# Patient Record
Sex: FEMALE | Race: OTHER | NOT HISPANIC OR LATINO | URBAN - METROPOLITAN AREA
[De-identification: names, ages, dates, MRNs, and addresses within clinical notes are randomized per-mention and may not be internally consistent; named-entity substitution may affect disease eponyms.]

---

## 2020-03-14 ENCOUNTER — INPATIENT (INPATIENT)
Facility: HOSPITAL | Age: 55
LOS: 8 days | Discharge: HOME | DRG: 871 | End: 2020-03-23
Attending: HOSPITALIST | Admitting: HOSPITALIST
Payer: MEDICAID

## 2020-03-14 VITALS
OXYGEN SATURATION: 95 % | TEMPERATURE: 101 F | HEIGHT: 61 IN | SYSTOLIC BLOOD PRESSURE: 125 MMHG | WEIGHT: 169.98 LBS | RESPIRATION RATE: 20 BRPM | DIASTOLIC BLOOD PRESSURE: 70 MMHG | HEART RATE: 110 BPM

## 2020-03-14 DIAGNOSIS — Z29.9 ENCOUNTER FOR PROPHYLACTIC MEASURES, UNSPECIFIED: ICD-10-CM

## 2020-03-14 DIAGNOSIS — E87.1 HYPO-OSMOLALITY AND HYPONATREMIA: ICD-10-CM

## 2020-03-14 DIAGNOSIS — E87.6 HYPOKALEMIA: ICD-10-CM

## 2020-03-14 DIAGNOSIS — J02.9 ACUTE PHARYNGITIS, UNSPECIFIED: ICD-10-CM

## 2020-03-14 DIAGNOSIS — E66.09 OTHER OBESITY DUE TO EXCESS CALORIES: ICD-10-CM

## 2020-03-14 DIAGNOSIS — Z02.9 ENCOUNTER FOR ADMINISTRATIVE EXAMINATIONS, UNSPECIFIED: ICD-10-CM

## 2020-03-14 DIAGNOSIS — I10 ESSENTIAL (PRIMARY) HYPERTENSION: ICD-10-CM

## 2020-03-14 DIAGNOSIS — J12.9 VIRAL PNEUMONIA, UNSPECIFIED: ICD-10-CM

## 2020-03-14 LAB
ALBUMIN SERPL ELPH-MCNC: 4.3 G/DL — SIGNIFICANT CHANGE UP (ref 3.3–5)
ALP SERPL-CCNC: 114 U/L — SIGNIFICANT CHANGE UP (ref 40–120)
ALT FLD-CCNC: 18 U/L — SIGNIFICANT CHANGE UP (ref 10–45)
ANION GAP SERPL CALC-SCNC: 12 MMOL/L — SIGNIFICANT CHANGE UP (ref 5–17)
ANION GAP SERPL CALC-SCNC: 14 MMOL/L — SIGNIFICANT CHANGE UP (ref 5–17)
APPEARANCE UR: CLEAR — SIGNIFICANT CHANGE UP
APTT BLD: 30.6 SEC — SIGNIFICANT CHANGE UP (ref 27.5–36.3)
AST SERPL-CCNC: 19 U/L — SIGNIFICANT CHANGE UP (ref 10–40)
BASOPHILS # BLD AUTO: 0.02 K/UL — SIGNIFICANT CHANGE UP (ref 0–0.2)
BASOPHILS NFR BLD AUTO: 0.2 % — SIGNIFICANT CHANGE UP (ref 0–2)
BILIRUB SERPL-MCNC: 0.3 MG/DL — SIGNIFICANT CHANGE UP (ref 0.2–1.2)
BILIRUB UR-MCNC: NEGATIVE — SIGNIFICANT CHANGE UP
BUN SERPL-MCNC: 10 MG/DL — SIGNIFICANT CHANGE UP (ref 7–23)
BUN SERPL-MCNC: 8 MG/DL — SIGNIFICANT CHANGE UP (ref 7–23)
CALCIUM SERPL-MCNC: 8.5 MG/DL — SIGNIFICANT CHANGE UP (ref 8.4–10.5)
CALCIUM SERPL-MCNC: 9.4 MG/DL — SIGNIFICANT CHANGE UP (ref 8.4–10.5)
CHLORIDE SERPL-SCNC: 104 MMOL/L — SIGNIFICANT CHANGE UP (ref 96–108)
CHLORIDE SERPL-SCNC: 91 MMOL/L — LOW (ref 96–108)
CO2 SERPL-SCNC: 21 MMOL/L — LOW (ref 22–31)
CO2 SERPL-SCNC: 22 MMOL/L — SIGNIFICANT CHANGE UP (ref 22–31)
COLOR SPEC: SIGNIFICANT CHANGE UP
CREAT SERPL-MCNC: 0.6 MG/DL — SIGNIFICANT CHANGE UP (ref 0.5–1.3)
CREAT SERPL-MCNC: 0.73 MG/DL — SIGNIFICANT CHANGE UP (ref 0.5–1.3)
DIFF PNL FLD: NEGATIVE — SIGNIFICANT CHANGE UP
EOSINOPHIL # BLD AUTO: 0.14 K/UL — SIGNIFICANT CHANGE UP (ref 0–0.5)
EOSINOPHIL NFR BLD AUTO: 1.5 % — SIGNIFICANT CHANGE UP (ref 0–6)
GAS PNL BLDV: SIGNIFICANT CHANGE UP
GLUCOSE SERPL-MCNC: 111 MG/DL — HIGH (ref 70–99)
GLUCOSE SERPL-MCNC: 121 MG/DL — HIGH (ref 70–99)
GLUCOSE UR QL: NEGATIVE — SIGNIFICANT CHANGE UP
HCT VFR BLD CALC: 46.9 % — HIGH (ref 34.5–45)
HGB BLD-MCNC: 15.1 G/DL — SIGNIFICANT CHANGE UP (ref 11.5–15.5)
IMM GRANULOCYTES NFR BLD AUTO: 0.2 % — SIGNIFICANT CHANGE UP (ref 0–1.5)
INR BLD: 1.17 RATIO — HIGH (ref 0.88–1.16)
KETONES UR-MCNC: NEGATIVE — SIGNIFICANT CHANGE UP
LEUKOCYTE ESTERASE UR-ACNC: NEGATIVE — SIGNIFICANT CHANGE UP
LYMPHOCYTES # BLD AUTO: 2.37 K/UL — SIGNIFICANT CHANGE UP (ref 1–3.3)
LYMPHOCYTES # BLD AUTO: 24.8 % — SIGNIFICANT CHANGE UP (ref 13–44)
MCHC RBC-ENTMCNC: 26 PG — LOW (ref 27–34)
MCHC RBC-ENTMCNC: 32.2 GM/DL — SIGNIFICANT CHANGE UP (ref 32–36)
MCV RBC AUTO: 80.7 FL — SIGNIFICANT CHANGE UP (ref 80–100)
MONOCYTES # BLD AUTO: 0.91 K/UL — HIGH (ref 0–0.9)
MONOCYTES NFR BLD AUTO: 9.5 % — SIGNIFICANT CHANGE UP (ref 2–14)
NEUTROPHILS # BLD AUTO: 6.11 K/UL — SIGNIFICANT CHANGE UP (ref 1.8–7.4)
NEUTROPHILS NFR BLD AUTO: 63.8 % — SIGNIFICANT CHANGE UP (ref 43–77)
NITRITE UR-MCNC: NEGATIVE — SIGNIFICANT CHANGE UP
NRBC # BLD: 0 /100 WBCS — SIGNIFICANT CHANGE UP (ref 0–0)
PH UR: 7.5 — SIGNIFICANT CHANGE UP (ref 5–8)
PLATELET # BLD AUTO: 217 K/UL — SIGNIFICANT CHANGE UP (ref 150–400)
POTASSIUM SERPL-MCNC: 3.3 MMOL/L — LOW (ref 3.5–5.3)
POTASSIUM SERPL-MCNC: 3.9 MMOL/L — SIGNIFICANT CHANGE UP (ref 3.5–5.3)
POTASSIUM SERPL-SCNC: 3.3 MMOL/L — LOW (ref 3.5–5.3)
POTASSIUM SERPL-SCNC: 3.9 MMOL/L — SIGNIFICANT CHANGE UP (ref 3.5–5.3)
PROT SERPL-MCNC: 8.5 G/DL — HIGH (ref 6–8.3)
PROT UR-MCNC: NEGATIVE — SIGNIFICANT CHANGE UP
PROTHROM AB SERPL-ACNC: 13.4 SEC — HIGH (ref 10–12.9)
RAPID RVP RESULT: SIGNIFICANT CHANGE UP
RBC # BLD: 5.81 M/UL — HIGH (ref 3.8–5.2)
RBC # FLD: 14.3 % — SIGNIFICANT CHANGE UP (ref 10.3–14.5)
S PYO AG SPEC QL IA: NEGATIVE — SIGNIFICANT CHANGE UP
SODIUM SERPL-SCNC: 127 MMOL/L — LOW (ref 135–145)
SODIUM SERPL-SCNC: 137 MMOL/L — SIGNIFICANT CHANGE UP (ref 135–145)
SP GR SPEC: 1.01 — SIGNIFICANT CHANGE UP (ref 1.01–1.02)
UROBILINOGEN FLD QL: NEGATIVE — SIGNIFICANT CHANGE UP
WBC # BLD: 9.57 K/UL — SIGNIFICANT CHANGE UP (ref 3.8–10.5)
WBC # FLD AUTO: 9.57 K/UL — SIGNIFICANT CHANGE UP (ref 3.8–10.5)

## 2020-03-14 PROCEDURE — 71250 CT THORAX DX C-: CPT | Mod: 26

## 2020-03-14 PROCEDURE — 99232 SBSQ HOSP IP/OBS MODERATE 35: CPT | Mod: GC

## 2020-03-14 PROCEDURE — 99223 1ST HOSP IP/OBS HIGH 75: CPT

## 2020-03-14 PROCEDURE — 71045 X-RAY EXAM CHEST 1 VIEW: CPT | Mod: 26

## 2020-03-14 PROCEDURE — 99284 EMERGENCY DEPT VISIT MOD MDM: CPT

## 2020-03-14 RX ORDER — ACETAMINOPHEN 500 MG
975 TABLET ORAL EVERY 6 HOURS
Refills: 0 | Status: DISCONTINUED | OUTPATIENT
Start: 2020-03-14 | End: 2020-03-23

## 2020-03-14 RX ORDER — ACETAMINOPHEN 500 MG
650 TABLET ORAL ONCE
Refills: 0 | Status: COMPLETED | OUTPATIENT
Start: 2020-03-14 | End: 2020-03-14

## 2020-03-14 RX ORDER — ALBUTEROL 90 UG/1
2 AEROSOL, METERED ORAL
Qty: 0 | Refills: 0 | DISCHARGE

## 2020-03-14 RX ORDER — ENOXAPARIN SODIUM 100 MG/ML
40 INJECTION SUBCUTANEOUS DAILY
Refills: 0 | Status: DISCONTINUED | OUTPATIENT
Start: 2020-03-15 | End: 2020-03-23

## 2020-03-14 RX ORDER — ERGOCALCIFEROL 1.25 MG/1
1 CAPSULE ORAL
Qty: 0 | Refills: 0 | DISCHARGE

## 2020-03-14 RX ORDER — OXYBUTYNIN CHLORIDE 5 MG
0 TABLET ORAL
Qty: 0 | Refills: 0 | DISCHARGE

## 2020-03-14 RX ORDER — SODIUM CHLORIDE 9 MG/ML
2400 INJECTION, SOLUTION INTRAVENOUS ONCE
Refills: 0 | Status: COMPLETED | OUTPATIENT
Start: 2020-03-14 | End: 2020-03-14

## 2020-03-14 RX ORDER — POTASSIUM CHLORIDE 20 MEQ
40 PACKET (EA) ORAL ONCE
Refills: 0 | Status: COMPLETED | OUTPATIENT
Start: 2020-03-14 | End: 2020-03-14

## 2020-03-14 RX ORDER — CEFTRIAXONE 500 MG/1
1000 INJECTION, POWDER, FOR SOLUTION INTRAMUSCULAR; INTRAVENOUS EVERY 24 HOURS
Refills: 0 | Status: DISCONTINUED | OUTPATIENT
Start: 2020-03-14 | End: 2020-03-16

## 2020-03-14 RX ORDER — AZITHROMYCIN 500 MG/1
500 TABLET, FILM COATED ORAL EVERY 24 HOURS
Refills: 0 | Status: DISCONTINUED | OUTPATIENT
Start: 2020-03-14 | End: 2020-03-16

## 2020-03-14 RX ORDER — AMPICILLIN SODIUM AND SULBACTAM SODIUM 250; 125 MG/ML; MG/ML
3 INJECTION, POWDER, FOR SUSPENSION INTRAMUSCULAR; INTRAVENOUS ONCE
Refills: 0 | Status: COMPLETED | OUTPATIENT
Start: 2020-03-14 | End: 2020-03-14

## 2020-03-14 RX ADMIN — Medication 975 MILLIGRAM(S): at 13:45

## 2020-03-14 RX ADMIN — SODIUM CHLORIDE 2400 MILLILITER(S): 9 INJECTION, SOLUTION INTRAVENOUS at 05:21

## 2020-03-14 RX ADMIN — Medication 650 MILLIGRAM(S): at 05:21

## 2020-03-14 RX ADMIN — Medication 650 MILLIGRAM(S): at 05:51

## 2020-03-14 RX ADMIN — Medication 650 MILLIGRAM(S): at 21:30

## 2020-03-14 RX ADMIN — AMPICILLIN SODIUM AND SULBACTAM SODIUM 200 GRAM(S): 250; 125 INJECTION, POWDER, FOR SUSPENSION INTRAMUSCULAR; INTRAVENOUS at 05:21

## 2020-03-14 RX ADMIN — Medication 975 MILLIGRAM(S): at 12:49

## 2020-03-14 RX ADMIN — Medication 40 MILLIEQUIVALENT(S): at 12:34

## 2020-03-14 RX ADMIN — CEFTRIAXONE 100 MILLIGRAM(S): 500 INJECTION, POWDER, FOR SOLUTION INTRAMUSCULAR; INTRAVENOUS at 18:24

## 2020-03-14 RX ADMIN — Medication 650 MILLIGRAM(S): at 19:59

## 2020-03-14 RX ADMIN — AZITHROMYCIN 250 MILLIGRAM(S): 500 TABLET, FILM COATED ORAL at 12:34

## 2020-03-14 NOTE — ED ADULT NURSE NOTE - OBJECTIVE STATEMENT
Patient is a 55 year old female complaining of fever x 4 days. Patient has history of. Patient is A&O x 4 and appears sleepy. Pt reports falling ten days ago denies hitting her head, pt has had fever x 4 days and was seen by her pcp and started on abx. pt states she has generalized body aches and throat pain. pt also complaining of right arm. Denies complaints of chest pain, sob, n/v/d, headache, syncope, burning urination, blood in urine, blood in stool. Abd is soft, non tender, non distended. Skin is warm and dry. Color is consistent with ethnicity. Safety and comfort maintained. Will continue to monitor. Patient is a 55 year old female complaining of fever x 4 days. Patient has history of htn. Patient is A&O x 4 and appears sleepy. Pt reports falling ten days ago denies hitting her head, pt has had fever x 4 days and was seen by her pcp and started on abx. pt states she has generalized body aches and throat pain , and weakness. pt also complaining of right arm. Denies complaints of chest pain, sob, n/v/d, headache, syncope, burning urination, blood in urine, blood in stool. Abd is soft, non tender, non distended. Skin is warm and dry. Color is consistent with ethnicity. Safety and comfort maintained. Will continue to monitor.

## 2020-03-14 NOTE — CONSULT NOTE ADULT - ASSESSMENT
55 year old female with a history of fibromyalgia, HTN, seasonal allergies who is presenting with 10 days of fever, found to have CT Chest findings of peripheral GGO.s c/f possible viral pneumonia.    Fevers, ground glass opacities  -f/u COVID 19 PCR  -RVP neg  -f/u urine legionella  -f/u Bcx  -would treat as CAP in interim, CTX/Azithromycin  -send quantiferon

## 2020-03-14 NOTE — CONSULT NOTE ADULT - ATTENDING COMMENTS
Patient seen and examined   Case discussed with Dr. Valverde  I agree with his history and exam as noted above    Patient has no recent travel, lives at home with her  and extended family who are feeling well but she may have had a syncopal episode 3/4 and since then has had non resolving fevers, cough, sinus congestion no sputum    She is resting in bed, mildly tachypneic but is eating her dinner  LUngs- crackles bilaterally posteriorly  Cor- tachy  Abd- non tender  Extrem- right elbow bruise no joint swelling      WBCwnl  CT scan with bilateral ground glass opacities    Agree with testing for COVID-19  Would also check urine legionella  quantiferon  RVP was negative  Agree with ABx Ceftriaxone and Azithromycin pending legionella testing  supportive care  Transfer to ICU if she decompensates respiratory status    Gagandeep Forrest MD  [phone]  After 5pm/weekends [phone]

## 2020-03-14 NOTE — ED PROVIDER NOTE - OBJECTIVE STATEMENT
54yo F h/o HTN here with fever    States 10 days of fever with throat pain, put on levaquin by PCP for pharyngitis however sx continue to worsen with weakness, fevers. No cough

## 2020-03-14 NOTE — H&P ADULT - PROBLEM SELECTOR PLAN 5
Transitions of Care Status:  1.  Name of PCP: Amita Brody  2.  PCP Contacted on Admission: [ ] Y    [ ] N    3.  PCP contacted at Discharge: [ ] Y    [ ] N    [ ] N/A  4.  Post-Discharge Appointment Date and Location:  5.  Summary of Handoff given to PCP: Transitions of Care Status:  1.  Name of PCP: Amita Brody  2.  PCP Contacted on Admission: [x] Y    [ ] N    3.  PCP contacted at Discharge: [ ] Y    [ ] N    [ ] N/A  4.  Post-Discharge Appointment Date and Location:  5.  Summary of Handoff given to PCP: -  on dietary restrictions. Will hold off on nutrition consult for now until COVID-19 ruled out

## 2020-03-14 NOTE — H&P ADULT - PROBLEM SELECTOR PLAN 3
- Lovenox, IPC  - Regular diet -  on dietary restrictions. Will hold off on nutrition consult for now until COVID-19 ruled out Repleted  - Repeat BMP in afternoon

## 2020-03-14 NOTE — PROVIDER CONTACT NOTE (OTHER) - ACTION/TREATMENT ORDERED:
will continue monitor the patient will continue monitor the patient,later kcl 40 meq o x 1 dose.repeat k was 3.9 mmol

## 2020-03-14 NOTE — H&P ADULT - ASSESSMENT
55 year old female with a history of fibromyalgia, HTN, seasonal allergies who is presenting with 10 days of fever and viral symptoms, found to have bilateral GGO concerning for viral pneumonia.

## 2020-03-14 NOTE — H&P ADULT - PROBLEM SELECTOR PROBLEM 3
Prophylactic measure Class 1 obesity due to excess calories without serious comorbidity with body mass index (BMI) of 32.0 to 32.9 in adult Hypokalemia

## 2020-03-14 NOTE — ED PROVIDER NOTE - CLINICAL SUMMARY MEDICAL DECISION MAKING FREE TEXT BOX
[place] PGY-2:  56yo F here w/ fevers and pharyngitis, failed levaquin will obtain bloodwork, IV abx and anticipate admission for further workup

## 2020-03-14 NOTE — H&P ADULT - HISTORY OF PRESENT ILLNESS
55 year old female with a history of fibromyalgia, HTN, seasonal allergies who is presenting with 10 days of fever.    History was obtained via daughter-in-law, a medical student in [place]. She tells me ~2 weeks ago, her mother slipped and fell on a toy at home and injured her right elbow. She went to urgent care. Within next 1-2 days, developed low grade fevers a home, which persisted over the next week up to >101. She also developed myalgias, full body aches, weakness, and throat pain/ odynophagia. She has also had dry cough and some mild dyspnea throughout this period. No diarrhea, N/V, abdominal pain, chest pain.   She went to her PCP office on Wednesday, who told her to go to the hospital to r/o COVID-19 and handed her antibiotics She did not want to come to the ED, however, her fever was so high, her family urged her to go get an evaluation.  No recent travel. She lives with her , sister/brother in law, their kids. 55 year old female with a history of fibromyalgia, HTN, seasonal allergies who is presenting with 10 days of fever.    History was obtained via daughter-in-law, a medical student in [place]. She tells me ~2 weeks ago, her mother slipped and fell on a toy at home and injured her right elbow. She went to urgent care. Within next 1-2 days, developed low grade fevers a home, which persisted over the next week up to >101. She also developed myalgias, full body aches, weakness, and throat pain/ odynophagia. She has also had dry cough and some mild dyspnea throughout this period. No diarrhea, N/V, abdominal pain, chest pain.   She went to her PCP office on Wednesday, who told her to go to the hospital to r/o COVID-19 and handed her antibiotics She did not want to come to the ED, however, her fever was so high, her family urged her to go get an evaluation.  No recent travel. She lives with her , sister/brother in law, their kids. She spent time with her daughter-in-law who is a medical student in [place].

## 2020-03-14 NOTE — ED PROVIDER NOTE - ATTENDING CONTRIBUTION TO CARE
Fever. Awake and Alert. Lungs CTA. Heart RRR. Abdomen soft NTND. CN II-XII grossly intact. Moves all extremities without lateralization. Mouth: Oropharynx clear, uvula midline, no tonsilar hypertrophy, exudate, +erythema, no anterior cervical lymphadenopathy. No drooling. No hoarseness.

## 2020-03-14 NOTE — H&P ADULT - NSHPPHYSICALEXAM_GEN_ALL_CORE
T(C): 37 (03-14-20 @ 09:22), Max: 38.5 (03-14-20 @ 05:37)  HR: 82 (03-14-20 @ 09:22) (82 - 110)  BP: 113/67 (03-14-20 @ 09:22) (103/69 - 125/70)  RR: 19 (03-14-20 @ 09:22) (19 - 20)  SpO2: 99% (03-14-20 @ 09:22) (95% - 99%)  General: In no acute distress, comfortable  Eyes: no conjunctival erythema, EOMI  ENT: MMM, normal dentition  Respiratory: CTA B/L, No wheezing, rales, rhonchi (difficult ot assess with isolation stethoscope)  CV: RRR no murmurs  Abdominal: Soft, NT, ND +BS  MSK: no focal weakness, no joint tenderness, no joint effusions  Extremities: No edema, 2+ peripheral pulses  Neurology: A&Ox3, nonfocal, LIANG x 4, no facial droop  Skin: No Rashes, Hematoma, Ecchymosis  Psych: Calm and appropriate mood and affect

## 2020-03-14 NOTE — H&P ADULT - NSHPLABSRESULTS_GEN_ALL_CORE
Labs personally reviewed:                          15.1   9.57  )-----------( 217      ( 14 Mar 2020 05:14 )             46.9       03-14    127<L>  |  91<L>  |  10  ----------------------------<  111<H>  3.3<L>   |  22  |  0.73    Ca    9.4      14 Mar 2020 05:14    TPro  8.5<H>  /  Alb  4.3  /  TBili  0.3  /  DBili  x   /  AST  19  /  ALT  18  /  AlkPhos  114  03-14      PT/INR - ( 14 Mar 2020 05:37 )   PT: 13.4 sec;   INR: 1.17 ratio         PTT - ( 14 Mar 2020 05:37 )  PTT:30.6 sec    CT of the Chest was performed without intravenous contrast.  Sagittal and coronal reformats were performed.      FINDINGS:    LUNGS AND AIRWAYS: Patent central airways.  Scattered ground glass opacities bilaterally, predominantly in a peripheral distribution, with associated intralobular septal thickening. Scattered linear opacities predominantly in the lower lungs bilaterally, possibly areas of partial linear atelectasis.     PLEURA: No pleural effusion.    MEDIASTINUM AND ROSIBEL: Mediastinal lymphadenopathy is seen. Aortic pulmonary window within lower image 34 measures 1.8 x 1.2 cm in size. Right paratracheal lymph node image 32 measures 1.6 x 1.3 cm    VESSELS: Within normal limits.    HEART: Heart size is normal. No pericardial effusion.    CHEST WALL AND LOWER NECK: Within normal limits.    VISUALIZED UPPER ABDOMEN: Subcentimeter hypodense foci in the liver, too small characterize.    BONES: Within normal limits.    IMPRESSION:     Nonspecific ground glass opacities predominantly in a peripheral distribution may be seen in the setting of viral pneumonia (e.g. COVID-19).                      Imaging personally reviewed:  CXR: Bilateral hazy opacities    EKG personally reviewed:

## 2020-03-14 NOTE — H&P ADULT - PROBLEM SELECTOR PLAN 2
- Holding antihypertensives for now Likely hypovolemic, hyponatremia in the setting of viral respiratory illness  - S/P IVF, will follow up repeat BMP in afternoon. Will give free water if exceeds greater than 8 mEq improvement/ 24 hours

## 2020-03-14 NOTE — H&P ADULT - PROBLEM SELECTOR PLAN 1
With CT findings concerning for COVID-19  RVP negative  - Isolation, Droplet, Contact, Airborne precautions  - COVID-19 sent and pending  - Will cover empirically with CTX/ Azithro for now, however if COVID returns positive, should DC.   - Will send urine legionella, sputum With CT findings concerning for COVID-19  RVP negative  - Isolation, Droplet, Contact, Airborne precautions  - COVID-19 sent and pending  - Will cover empirically with CTX/ Azithro for now, however if COVID returns positive, should DC.   - Will send urine legionella, sputum  - Procal

## 2020-03-14 NOTE — ED PROVIDER NOTE - PHYSICAL EXAMINATION
General: well appearing, interactive, well nourished, NAD  HEENT: pupils equal and reactive, normal external ears bilaterally , BL tonsil swelling with erythema and exudates   Cardiac: RRR, no MRG appreciated  Resp: lungs clear to auscultation bilaterally, symmetric chest wall rise  Abd: soft, nontender, nondistended,   : no CVA tenderness  Neuro: Moving all extremities  Skin:  normal color for race

## 2020-03-14 NOTE — H&P ADULT - PROBLEM SELECTOR PLAN 7
Transitions of Care Status:  1.  Name of PCP: Amita Brody  2.  PCP Contacted on Admission: [x] Y    [ ] N    3.  PCP contacted at Discharge: [ ] Y    [ ] N    [ ] N/A  4.  Post-Discharge Appointment Date and Location:  5.  Summary of Handoff given to PCP:

## 2020-03-14 NOTE — H&P ADULT - NSHPREVIEWOFSYSTEMS_GEN_ALL_CORE
REVIEW OF SYSTEMS:    CONSTITUTIONAL: + weakness, fevers or chills.  EYES: No blurry vision or eye pain.   ENT: + throat pain. No dysphagia.    NECK: No pain or stiffness  RESPIRATORY: + cough, No wheezing, hemoptysis; No shortness of breath  CARDIOVASCULAR: No chest pain or palpitations.  GASTROINTESTINAL: No abdominal pain. No nausea or vomiting; No diarrhea or constipation. No melena or hematochezia.  GENITOURINARY: No dysuria, frequency or hematuria  NEUROLOGICAL: No numbness or weakness. No dizziness or falls.   SKIN: No itching, burning, rashes, or lesions.   LYMPHATIC: No masses or swelling.   All other review of systems is negative unless indicated above.

## 2020-03-14 NOTE — CONSULT NOTE ADULT - SUBJECTIVE AND OBJECTIVE BOX
Patient is a 55y old  Female who presents with a chief complaint of Fever (14 Mar 2020 10:27)    HPI:  55 year old female with a history of fibromyalgia, HTN, seasonal allergies who is presenting with 10 days of fever.    History was obtained via daughter-in-law, a medical student in [place]. She tells me ~2 weeks ago, her mother slipped and fell on a toy at home and injured her right elbow. She went to urgent care. Within next 1-2 days, developed low grade fevers a home, which persisted over the next week up to >101. She also developed myalgias, full body aches, weakness, and throat pain/ odynophagia. She has also had dry cough and some mild dyspnea throughout this period. No diarrhea, N/V, abdominal pain, chest pain.   She went to her PCP office on Wednesday, who told her to go to the hospital to r/o COVID-19 and handed her antibiotics She did not want to come to the ED, however, her fever was so high, her family urged her to go get an evaluation.  No recent travel. She lives with her , sister/brother in law, their kids. She spent time with her daughter-in-law who is a medical student in [place]. (14 Mar 2020 10:27).    HPI and Hospital course reviewed. Pt with high fevers, no leukocytosis, UA, RVP, GAS all negative, CT Chest with peripheral GGO's c/f possible viral pneumonia.     prior hospital charts reviewed [  ]  primary team notes reviewed [  ]  other consultant notes reviewed [  ]  PAST MEDICAL & SURGICAL HISTORY:  Fibromyalgia  Hypertension    Allergies  No Known Allergies    ANTIMICROBIALS (past 90 days)  MEDICATIONS  (STANDING):  ampicillin/sulbactam  IVPB   200 mL/Hr IV Intermittent (20 @ 05:21)    azithromycin  IVPB   250 mL/Hr IV Intermittent (20 @ 12:34)      ANTIMICROBIALS:    azithromycin  IVPB 500 every 24 hours  cefTRIAXone   IVPB 1000 every 24 hours    OTHER MEDS: MEDICATIONS  (STANDING):  acetaminophen   Tablet .. 975 every 6 hours PRN    SOCIAL HISTORY:   hx smoking  non-smoker    FAMILY HISTORY:  No pertinent family history in first degree relatives    REVIEW OF SYSTEMS  [  ] ROS unobtainable because:    [  ] All other systems negative except as noted below:	    Constitutional:  [ ] fever [ ] chills  [ ] weight loss  [ ] weakness  Skin:  [ ] rash [ ] phlebitis	  Eyes: [ ] icterus [ ] pain  [ ] discharge	  ENMT: [ ] sore throat  [ ] thrush [ ] ulcers [ ] exudates  Respiratory: [ ] dyspnea [ ] hemoptysis [ ] cough [ ] sputum	  Cardiovascular:  [ ] chest pain [ ] palpitations [ ] edema	  Gastrointestinal:  [ ] nausea [ ] vomiting [ ] diarrhea [ ] constipation [ ] pain	  Genitourinary:  [ ] dysuria [ ] frequency [ ] hematuria [ ] discharge [ ] flank pain  [ ] incontinence  Musculoskeletal:  [ ] myalgias [ ] arthralgias [ ] arthritis  [ ] back pain  Neurological:  [ ] headache [ ] seizures  [ ] confusion/altered mental status  Psychiatric:  [ ] anxiety [ ] depression	  Hematology/Lymphatics:  [ ] lymphadenopathy  Endocrine:  [ ] adrenal [ ] thyroid  Allergic/Immunologic:	 [ ] transplant [ ] seasonal    Vital Signs Last 24 Hrs  T(F): 98.7 (20 @ 14:30), Max: 102.9 (20 @ 12:29)  Vital Signs Last 24 Hrs  HR: 88 (20 @ 12:29) (82 - 110)  BP: 131/70 (20 @ 12:29) (103/69 - 131/70)  RR: 19 (20 @ 12:29)  SpO2: 99% (20 @ 12:29) (95% - 99%)  Wt(kg): --    PHYSICAL EXAM:  Constitutional: non-toxic, no distress  HEAD/EYES: anicteric, no conjunctival injection  ENT:  supple, no thrush  Cardiovascular:   normal S1, S2, no murmur, no edema  Respiratory:  clear BS bilaterally, no wheezes, no rales  GI:  soft, non-tender, normal bowel sounds  :  no carver, no CVA tenderness  Musculoskeletal:  no synovitis, normal ROM  Neurologic: awake and alert, normal strength, no focal findings  Skin:  no rash, no erythema, no phlebitis  Heme/Onc: no lymphadenopathy   Psychiatric:  awake, alert, appropriate mood                            15.1   9.57  )-----------( 217      ( 14 Mar 2020 05:14 )             46.9         137  |  104  |  8   ----------------------------<  121<H>  3.9   |  21<L>  |  0.60    Ca    8.5      14 Mar 2020 14:29    TPro  8.5<H>  /  Alb  4.3  /  TBili  0.3  /  DBili  x   /  AST  19  /  ALT  18  /  AlkPhos  114  03-14    Urinalysis Basic - ( 14 Mar 2020 16:06 )    Color: Light Yellow / Appearance: Clear / S.014 / pH: x  Gluc: x / Ketone: Negative  / Bili: Negative / Urobili: Negative   Blood: x / Protein: Negative / Nitrite: Negative   Leuk Esterase: Negative / RBC: x / WBC x   Sq Epi: x / Non Sq Epi: x / Bacteria: x    MICROBIOLOGY:    Bcx, COVID 19 pending      Rapid RVP Result: NotDetec ( @ 07:35)      RADIOLOGY:  < from: CT Chest No Cont (20 @ 07:21) >    IMPRESSION:     Nonspecific ground glass opacities predominantly in a peripheral distribution may be seen in the setting of viral pneumonia (e.g. COVID-19).        imaging below personally reviewed

## 2020-03-14 NOTE — H&P ADULT - PROBLEM SELECTOR PLAN 4
Transitions of Care Status:  1.  Name of PCP: Amita Brody  2.  PCP Contacted on Admission: [ ] Y    [ ] N    3.  PCP contacted at Discharge: [ ] Y    [ ] N    [ ] N/A  4.  Post-Discharge Appointment Date and Location:  5.  Summary of Handoff given to PCP: - Lovenox, IPC  - Regular diet - Holding antihypertensives for now  - Routine vital signs

## 2020-03-14 NOTE — ED PROVIDER NOTE - NS ED ROS FT
CONSTITUTIONAL: No fevers, no chills  Cardiovascular: No Chest pain  Gastrointestinal: No n/v/d, no abd pain  SKIN: no rashes.  PSYCHIATRIC: no known mental health issues.

## 2020-03-14 NOTE — H&P ADULT - PROBLEM SELECTOR PROBLEM 5
Discharge planning issues Class 1 obesity due to excess calories without serious comorbidity with body mass index (BMI) of 32.0 to 32.9 in adult

## 2020-03-15 LAB
ANION GAP SERPL CALC-SCNC: 14 MMOL/L — SIGNIFICANT CHANGE UP (ref 5–17)
BASOPHILS # BLD AUTO: 0.03 K/UL — SIGNIFICANT CHANGE UP (ref 0–0.2)
BASOPHILS NFR BLD AUTO: 0.3 % — SIGNIFICANT CHANGE UP (ref 0–2)
BUN SERPL-MCNC: 6 MG/DL — LOW (ref 7–23)
CALCIUM SERPL-MCNC: 9 MG/DL — SIGNIFICANT CHANGE UP (ref 8.4–10.5)
CHLORIDE SERPL-SCNC: 100 MMOL/L — SIGNIFICANT CHANGE UP (ref 96–108)
CO2 SERPL-SCNC: 22 MMOL/L — SIGNIFICANT CHANGE UP (ref 22–31)
CREAT SERPL-MCNC: 0.74 MG/DL — SIGNIFICANT CHANGE UP (ref 0.5–1.3)
EOSINOPHIL # BLD AUTO: 0 K/UL — SIGNIFICANT CHANGE UP (ref 0–0.5)
EOSINOPHIL NFR BLD AUTO: 0 % — SIGNIFICANT CHANGE UP (ref 0–6)
GLUCOSE SERPL-MCNC: 105 MG/DL — HIGH (ref 70–99)
HCT VFR BLD CALC: 45.6 % — HIGH (ref 34.5–45)
HCV AB S/CO SERPL IA: 0.13 S/CO — SIGNIFICANT CHANGE UP (ref 0–0.99)
HCV AB SERPL-IMP: SIGNIFICANT CHANGE UP
HGB BLD-MCNC: 14.6 G/DL — SIGNIFICANT CHANGE UP (ref 11.5–15.5)
IMM GRANULOCYTES NFR BLD AUTO: 0.6 % — SIGNIFICANT CHANGE UP (ref 0–1.5)
LEGIONELLA AG UR QL: NEGATIVE — SIGNIFICANT CHANGE UP
LYMPHOCYTES # BLD AUTO: 3.71 K/UL — HIGH (ref 1–3.3)
LYMPHOCYTES # BLD AUTO: 31.7 % — SIGNIFICANT CHANGE UP (ref 13–44)
MCHC RBC-ENTMCNC: 25.9 PG — LOW (ref 27–34)
MCHC RBC-ENTMCNC: 32 GM/DL — SIGNIFICANT CHANGE UP (ref 32–36)
MCV RBC AUTO: 81 FL — SIGNIFICANT CHANGE UP (ref 80–100)
MONOCYTES # BLD AUTO: 0.87 K/UL — SIGNIFICANT CHANGE UP (ref 0–0.9)
MONOCYTES NFR BLD AUTO: 7.4 % — SIGNIFICANT CHANGE UP (ref 2–14)
NEUTROPHILS # BLD AUTO: 7.04 K/UL — SIGNIFICANT CHANGE UP (ref 1.8–7.4)
NEUTROPHILS NFR BLD AUTO: 60 % — SIGNIFICANT CHANGE UP (ref 43–77)
NRBC # BLD: 0 /100 WBCS — SIGNIFICANT CHANGE UP (ref 0–0)
PLATELET # BLD AUTO: 214 K/UL — SIGNIFICANT CHANGE UP (ref 150–400)
POTASSIUM SERPL-MCNC: 3.7 MMOL/L — SIGNIFICANT CHANGE UP (ref 3.5–5.3)
POTASSIUM SERPL-SCNC: 3.7 MMOL/L — SIGNIFICANT CHANGE UP (ref 3.5–5.3)
RBC # BLD: 5.63 M/UL — HIGH (ref 3.8–5.2)
RBC # FLD: 14.3 % — SIGNIFICANT CHANGE UP (ref 10.3–14.5)
SODIUM SERPL-SCNC: 136 MMOL/L — SIGNIFICANT CHANGE UP (ref 135–145)
WBC # BLD: 11.72 K/UL — HIGH (ref 3.8–10.5)
WBC # FLD AUTO: 11.72 K/UL — HIGH (ref 3.8–10.5)

## 2020-03-15 PROCEDURE — 99233 SBSQ HOSP IP/OBS HIGH 50: CPT

## 2020-03-15 RX ORDER — GABAPENTIN 400 MG/1
300 CAPSULE ORAL DAILY
Refills: 0 | Status: DISCONTINUED | OUTPATIENT
Start: 2020-03-15 | End: 2020-03-19

## 2020-03-15 RX ORDER — CHOLECALCIFEROL (VITAMIN D3) 125 MCG
1000 CAPSULE ORAL DAILY
Refills: 0 | Status: DISCONTINUED | OUTPATIENT
Start: 2020-03-15 | End: 2020-03-23

## 2020-03-15 RX ORDER — LANOLIN ALCOHOL/MO/W.PET/CERES
5 CREAM (GRAM) TOPICAL ONCE
Refills: 0 | Status: COMPLETED | OUTPATIENT
Start: 2020-03-15 | End: 2020-03-15

## 2020-03-15 RX ADMIN — Medication 975 MILLIGRAM(S): at 05:03

## 2020-03-15 RX ADMIN — Medication 975 MILLIGRAM(S): at 17:49

## 2020-03-15 RX ADMIN — Medication 975 MILLIGRAM(S): at 17:14

## 2020-03-15 RX ADMIN — ENOXAPARIN SODIUM 40 MILLIGRAM(S): 100 INJECTION SUBCUTANEOUS at 11:17

## 2020-03-15 RX ADMIN — Medication 5 MILLIGRAM(S): at 22:28

## 2020-03-15 RX ADMIN — AZITHROMYCIN 250 MILLIGRAM(S): 500 TABLET, FILM COATED ORAL at 12:15

## 2020-03-15 RX ADMIN — CEFTRIAXONE 100 MILLIGRAM(S): 500 INJECTION, POWDER, FOR SOLUTION INTRAMUSCULAR; INTRAVENOUS at 17:05

## 2020-03-15 RX ADMIN — Medication 975 MILLIGRAM(S): at 04:33

## 2020-03-15 NOTE — PROGRESS NOTE ADULT - PROBLEM SELECTOR PLAN 2
Likely hypovolemic, hyponatremia in the setting of viral respiratory illness  - S/P IVF,  - resolved

## 2020-03-15 NOTE — PROGRESS NOTE ADULT - PROBLEM SELECTOR PLAN 1
CT chest with Nonspecific ground glass opacities predominantly in a peripheral distribution concerning for covid   RVP negative  - Isolation, Droplet, Contact, Airborne precautions  - COVID-19 sent and pending  - Will cover empirically with CTX/ Azithro   - Follow up urine legionella, sputum cx, blood cx   - ID eval

## 2020-03-15 NOTE — PROGRESS NOTE ADULT - PROBLEM SELECTOR PROBLEM 5
Class 1 obesity due to excess calories without serious comorbidity with body mass index (BMI) of 32.0 to 32.9 in adult

## 2020-03-15 NOTE — PROGRESS NOTE ADULT - SUBJECTIVE AND OBJECTIVE BOX
Patient is a 55y old  Female who presents with a chief complaint of Fever (14 Mar 2020 17:07)      SUBJECTIVE / OVERNIGHT EVENTS: feels ok, breathing is better, no cp    MEDICATIONS  (STANDING):  azithromycin  IVPB 500 milliGRAM(s) IV Intermittent every 24 hours  cefTRIAXone   IVPB 1000 milliGRAM(s) IV Intermittent every 24 hours  enoxaparin Injectable 40 milliGRAM(s) SubCutaneous daily    MEDICATIONS  (PRN):  acetaminophen   Tablet .. 975 milliGRAM(s) Oral every 6 hours PRN Mild Pain (1 - 3)        CAPILLARY BLOOD GLUCOSE        I&O's Summary    14 Mar 2020 07:01  -  15 Mar 2020 07:00  --------------------------------------------------------  IN: 650 mL / OUT: 1800 mL / NET: -1150 mL    15 Mar 2020 07:01  -  15 Mar 2020 17:16  --------------------------------------------------------  IN: 1260 mL / OUT: 0 mL / NET: 1260 mL        PHYSICAL EXAM:  GENERAL: NAD, well-developed  HEAD:  Atraumatic, Normocephalic  EYES: conjunctiva and sclera clear  NECK:  No JVD  CHEST/LUNG: crackels at bases   HEART: Regular rate and rhythm; S1S2  ABDOMEN: Soft, Nontender, Nondistended; Bowel sounds present  EXTREMITIES:  2+ Peripheral Pulses, No clubbing, cyanosis, or edema  PSYCH: AAOx3  NEUROLOGY: non-focal  SKIN: No rashes or lesions    LABS:                        14.6   11.72 )-----------( 214      ( 15 Mar 2020 05:13 )             45.6     03-15    136  |  100  |  6<L>  ----------------------------<  105<H>  3.7   |  22  |  0.74    Ca    9.0      15 Mar 2020 05:13    TPro  8.5<H>  /  Alb  4.3  /  TBili  0.3  /  DBili  x   /  AST  19  /  ALT  18  /  AlkPhos  114  03-14    PT/INR - ( 14 Mar 2020 05:37 )   PT: 13.4 sec;   INR: 1.17 ratio         PTT - ( 14 Mar 2020 05:37 )  PTT:30.6 sec      Urinalysis Basic - ( 14 Mar 2020 16:06 )    Color: Light Yellow / Appearance: Clear / S.014 / pH: x  Gluc: x / Ketone: Negative  / Bili: Negative / Urobili: Negative   Blood: x / Protein: Negative / Nitrite: Negative   Leuk Esterase: Negative / RBC: x / WBC x   Sq Epi: x / Non Sq Epi: x / Bacteria: x        RADIOLOGY & ADDITIONAL TESTS:    Imaging Personally Reviewed:    Consultant(s) Notes Reviewed:      Care Discussed with Consultants/Other Providers:

## 2020-03-16 DIAGNOSIS — R52 PAIN, UNSPECIFIED: ICD-10-CM

## 2020-03-16 LAB
ANION GAP SERPL CALC-SCNC: 14 MMOL/L — SIGNIFICANT CHANGE UP (ref 5–17)
BUN SERPL-MCNC: 9 MG/DL — SIGNIFICANT CHANGE UP (ref 7–23)
CALCIUM SERPL-MCNC: 9.1 MG/DL — SIGNIFICANT CHANGE UP (ref 8.4–10.5)
CHLORIDE SERPL-SCNC: 101 MMOL/L — SIGNIFICANT CHANGE UP (ref 96–108)
CO2 SERPL-SCNC: 22 MMOL/L — SIGNIFICANT CHANGE UP (ref 22–31)
CREAT SERPL-MCNC: 0.57 MG/DL — SIGNIFICANT CHANGE UP (ref 0.5–1.3)
GAMMA INTERFERON BACKGROUND BLD IA-ACNC: 0.14 IU/ML — SIGNIFICANT CHANGE UP
GLUCOSE SERPL-MCNC: 100 MG/DL — HIGH (ref 70–99)
HCT VFR BLD CALC: 44 % — SIGNIFICANT CHANGE UP (ref 34.5–45)
HGB BLD-MCNC: 13.7 G/DL — SIGNIFICANT CHANGE UP (ref 11.5–15.5)
LEGIONELLA AG UR QL: NEGATIVE — SIGNIFICANT CHANGE UP
M TB IFN-G BLD-IMP: NEGATIVE — SIGNIFICANT CHANGE UP
M TB IFN-G CD4+ BCKGRND COR BLD-ACNC: 0.01 IU/ML — SIGNIFICANT CHANGE UP
M TB IFN-G CD4+CD8+ BCKGRND COR BLD-ACNC: 0 IU/ML — SIGNIFICANT CHANGE UP
MCHC RBC-ENTMCNC: 25 PG — LOW (ref 27–34)
MCHC RBC-ENTMCNC: 31.1 GM/DL — LOW (ref 32–36)
MCV RBC AUTO: 80.4 FL — SIGNIFICANT CHANGE UP (ref 80–100)
NRBC # BLD: 0 /100 WBCS — SIGNIFICANT CHANGE UP (ref 0–0)
PLATELET # BLD AUTO: 221 K/UL — SIGNIFICANT CHANGE UP (ref 150–400)
POTASSIUM SERPL-MCNC: 3.8 MMOL/L — SIGNIFICANT CHANGE UP (ref 3.5–5.3)
POTASSIUM SERPL-SCNC: 3.8 MMOL/L — SIGNIFICANT CHANGE UP (ref 3.5–5.3)
QUANT TB PLUS MITOGEN MINUS NIL: 7.37 IU/ML — SIGNIFICANT CHANGE UP
RBC # BLD: 5.47 M/UL — HIGH (ref 3.8–5.2)
RBC # FLD: 14.2 % — SIGNIFICANT CHANGE UP (ref 10.3–14.5)
SARS-COV-2 RNA SPEC QL NAA+PROBE: DETECTED
SODIUM SERPL-SCNC: 137 MMOL/L — SIGNIFICANT CHANGE UP (ref 135–145)
WBC # BLD: 10.16 K/UL — SIGNIFICANT CHANGE UP (ref 3.8–10.5)
WBC # FLD AUTO: 10.16 K/UL — SIGNIFICANT CHANGE UP (ref 3.8–10.5)

## 2020-03-16 PROCEDURE — 99232 SBSQ HOSP IP/OBS MODERATE 35: CPT

## 2020-03-16 PROCEDURE — 99233 SBSQ HOSP IP/OBS HIGH 50: CPT

## 2020-03-16 RX ORDER — GUAIFENESIN/DEXTROMETHORPHAN 600MG-30MG
10 TABLET, EXTENDED RELEASE 12 HR ORAL ONCE
Refills: 0 | Status: COMPLETED | OUTPATIENT
Start: 2020-03-16 | End: 2020-03-16

## 2020-03-16 RX ORDER — LANOLIN ALCOHOL/MO/W.PET/CERES
1 CREAM (GRAM) TOPICAL AT BEDTIME
Refills: 0 | Status: DISCONTINUED | OUTPATIENT
Start: 2020-03-16 | End: 2020-03-23

## 2020-03-16 RX ORDER — LANOLIN ALCOHOL/MO/W.PET/CERES
5 CREAM (GRAM) TOPICAL ONCE
Refills: 0 | Status: COMPLETED | OUTPATIENT
Start: 2020-03-16 | End: 2020-03-16

## 2020-03-16 RX ADMIN — Medication 5 MILLIGRAM(S): at 22:38

## 2020-03-16 RX ADMIN — Medication 10 MILLILITER(S): at 09:14

## 2020-03-16 RX ADMIN — ENOXAPARIN SODIUM 40 MILLIGRAM(S): 100 INJECTION SUBCUTANEOUS at 14:55

## 2020-03-16 RX ADMIN — Medication 1 TABLET(S): at 14:55

## 2020-03-16 RX ADMIN — Medication 1000 UNIT(S): at 14:55

## 2020-03-16 RX ADMIN — GABAPENTIN 300 MILLIGRAM(S): 400 CAPSULE ORAL at 14:55

## 2020-03-16 RX ADMIN — Medication 10 MILLILITER(S): at 23:00

## 2020-03-16 NOTE — PROGRESS NOTE ADULT - SUBJECTIVE AND OBJECTIVE BOX
[facility] Division of Hospital Medicine  Torie Garcia MD  Pager (M-F, 8A-5P): [phone]  Other Times:  [phone]    Patient is a 55y old  Female who presents with a chief complaint of Fever (15 Mar 2020 17:15)      SUBJECTIVE / OVERNIGHT EVENTS:  c/o right arm elbow pain worse with pressure. febrile 100.6. still with coughing denies any significant SOB  Sore throat with difficulty swallowing.     ADDITIONAL REVIEW OF SYSTEMS:    MEDICATIONS  (STANDING):  azithromycin  IVPB 500 milliGRAM(s) IV Intermittent every 24 hours  cefTRIAXone   IVPB 1000 milliGRAM(s) IV Intermittent every 24 hours  cholecalciferol 1000 Unit(s) Oral daily  enoxaparin Injectable 40 milliGRAM(s) SubCutaneous daily  gabapentin 300 milliGRAM(s) Oral daily  multivitamin 1 Tablet(s) Oral daily    MEDICATIONS  (PRN):  acetaminophen   Tablet .. 975 milliGRAM(s) Oral every 6 hours PRN Mild Pain (1 - 3)      CAPILLARY BLOOD GLUCOSE        I&O's Summary    15 Mar 2020 07:01  -  16 Mar 2020 07:00  --------------------------------------------------------  IN: 1380 mL / OUT: 0 mL / NET: 1380 mL    16 Mar 2020 07:01  -  16 Mar 2020 14:32  --------------------------------------------------------  IN: 120 mL / OUT: 300 mL / NET: -180 mL        PHYSICAL EXAM:  Vital Signs Last 24 Hrs  T(C): 37 (16 Mar 2020 12:34), Max: 38.1 (15 Mar 2020 17:38)  T(F): 98.6 (16 Mar 2020 12:34), Max: 100.6 (15 Mar 2020 17:38)  HR: 83 (16 Mar 2020 12:34) (82 - 85)  BP: 115/60 (16 Mar 2020 12:34) (111/72 - 121/75)  BP(mean): --  RR: 18 (16 Mar 2020 12:34) (18 - 18)  SpO2: 94% (16 Mar 2020 12:34) (93% - 95%)    CONSTITUTIONAL: NAD, obese  EYES:  conjunctiva and sclera clear  ENMT: dry oral mucosa  NECK: Supple, no palpable masses; no JVD  RESPIRATORY: Normal respiratory effort, bibasilar rhonchi  CARDIOVASCULAR: Regular rate and rhythm, normal S1 and S2, no murmur/rub/gallop; trace LE edema  ABDOMEN: Nontender to palpation, normoactive bowel sounds, no rebound/guarding  MUSCULOSKELETAL:  dec RUE range of motion sec to pain in elbow. no tenderness or edema.   PSYCH: A+O to person, place, and time; affect appropriate  SKIN: No rashes; no palpable lesions    LABS:                        13.7   10.16 )-----------( 221      ( 16 Mar 2020 07:19 )             44.0     03-16    137  |  101  |  9   ----------------------------<  100<H>  3.8   |  22  |  0.57    Ca    9.1      16 Mar 2020 07:14            Urinalysis Basic - ( 14 Mar 2020 16:06 )    Color: Light Yellow / Appearance: Clear / S.014 / pH: x  Gluc: x / Ketone: Negative  / Bili: Negative / Urobili: Negative   Blood: x / Protein: Negative / Nitrite: Negative   Leuk Esterase: Negative / RBC: x / WBC x   Sq Epi: x / Non Sq Epi: x / Bacteria: x        Culture - Group A Streptococcus (collected 14 Mar 2020 09:14)  Source: .Throat  Final Report (15 Mar 2020 22:59):    No Streptococcus pyogenes (Group A) isolated    Culture - Blood (collected 14 Mar 2020 06:54)  Source: .Blood Blood-Peripheral  Preliminary Report (15 Mar 2020 07:01):    No growth to date.    Culture - Blood (collected 14 Mar 2020 06:54)  Source: .Blood Blood-Peripheral  Preliminary Report (15 Mar 2020 07:01):    No growth to date.        RADIOLOGY & ADDITIONAL TESTS:  Results Reviewed:   Imaging Personally Reviewed:  Electrocardiogram Personally Reviewed:    COORDINATION OF CARE:  Care Discussed with Consultants/Other Providers [Y/N]:  Prior or Outpatient Records Reviewed [Y/N]:

## 2020-03-16 NOTE — PROGRESS NOTE ADULT - SUBJECTIVE AND OBJECTIVE BOX
INFECTIOUS DISEASES FOLLOW UP-- Ivory Forrest  [phone]    This is a follow up note for this  55yFemale with  Acute pharyngitis  sore throat persists, minimal cough      ROS:  CONSTITUTIONAL: sore thraot, intermittent dry cough  but comfortable not using supplemental oxygen watching tV    Allergies    No Known Allergies    Intolerances        ANTIBIOTICS/RELEVANT:  antimicrobials    immunologic:    OTHER:  acetaminophen   Tablet .. 975 milliGRAM(s) Oral every 6 hours PRN  cholecalciferol 1000 Unit(s) Oral daily  enoxaparin Injectable 40 milliGRAM(s) SubCutaneous daily  gabapentin 300 milliGRAM(s) Oral daily  melatonin 1 milliGRAM(s) Oral at bedtime  multivitamin 1 Tablet(s) Oral daily      Objective:  Vital Signs Last 24 Hrs  T(C): 37 (16 Mar 2020 12:34), Max: 37.3 (16 Mar 2020 05:30)  T(F): 98.6 (16 Mar 2020 12:34), Max: 99.1 (16 Mar 2020 05:30)  HR: 83 (16 Mar 2020 12:34) (82 - 85)  BP: 115/60 (16 Mar 2020 12:34) (111/72 - 121/75)  BP(mean): --  RR: 18 (16 Mar 2020 12:34) (18 - 18)  SpO2: 94% (16 Mar 2020 12:34) (93% - 95%)    PHYSICAL EXAM:  Constitutional:no acute distress  Eyes:MILTON, EOMI  Ear/Nose/Throat: no oral lesions, 	  Respiratory: clear BL  Cardiovascular: S1S2  Gastrointestinal:soft, (+) BS, no tenderness  Extremities:no e/e/c  No Lymphadenopathy  IV sites not inflammed.    LABS:                        13.7   10.16 )-----------( 221      ( 16 Mar 2020 07:19 )             44.0     03-16    137  |  101  |  9   ----------------------------<  100<H>  3.8   |  22  |  0.57    Ca    9.1      16 Mar 2020 07:14            MICROBIOLOGY:            RECENT CULTURES:  03-14 @ 09:14  .Throat  --  --  --    No Streptococcus pyogenes (Group A) isolated  --  03-14 @ 06:54  .Blood Blood-Peripheral  --  --  --    No growth to date.  --      RADIOLOGY & ADDITIONAL STUDIES:    < from: CT Chest No Cont (03.14.20 @ 07:21) >    IMPRESSION:     Nonspecific ground glass opacities predominantly in a peripheral distribution may be seen in the setting of viral pneumonia (e.g. COVID-19).    < end of copied text >

## 2020-03-16 NOTE — PROGRESS NOTE ADULT - PROBLEM SELECTOR PLAN 1
COVID + confirmed. CT chest with Nonspecific ground glass opacities   - Isolation, Droplet, Contact, Airborne precautions  - Will d/c CTX/ Azithro   - Follow up urine legionella, sputum cx, blood cx  - Quant gold neg.

## 2020-03-16 NOTE — PROGRESS NOTE ADULT - PROBLEM SELECTOR PLAN 2
C/o right UE pain worse with movement.   Recent fall on 3/4 s/p xray as outpt neg.   No evidence of significant bone injury. likely more  MS in etiology  Monitor pain control.

## 2020-03-16 NOTE — PROGRESS NOTE ADULT - ASSESSMENT
55 year old female with a history of fibromyalgia, HTN, seasonal allergies who is presenting with 10 days of fever, found to have CT Chest findings of peripheral GGO.s c/f possible viral pneumonia.    Fevers, ground glass opacities  -positive COVID 19 PCR  -RVP neg  -f/u urine legionella was negative  antibiotics were discontinued  send G6PD  supportive care  monitor oxygenation sat.    Gagandeep Forrest MD  [phone]  After 5pm/weekends [phone]

## 2020-03-16 NOTE — PROVIDER CONTACT NOTE (CRITICAL VALUE NOTIFICATION) - ACTION/TREATMENT ORDERED:
No further actions/treatments ordered at this time. Will continue to monitor and maintain pt safety.

## 2020-03-17 LAB
ALBUMIN SERPL ELPH-MCNC: 3.3 G/DL — SIGNIFICANT CHANGE UP (ref 3.3–5)
ALP SERPL-CCNC: 86 U/L — SIGNIFICANT CHANGE UP (ref 40–120)
ALT FLD-CCNC: 19 U/L — SIGNIFICANT CHANGE UP (ref 10–45)
ANION GAP SERPL CALC-SCNC: 15 MMOL/L — SIGNIFICANT CHANGE UP (ref 5–17)
AST SERPL-CCNC: 23 U/L — SIGNIFICANT CHANGE UP (ref 10–40)
BASOPHILS # BLD AUTO: 0 K/UL — SIGNIFICANT CHANGE UP (ref 0–0.2)
BASOPHILS NFR BLD AUTO: 0 % — SIGNIFICANT CHANGE UP (ref 0–2)
BILIRUB SERPL-MCNC: 0.2 MG/DL — SIGNIFICANT CHANGE UP (ref 0.2–1.2)
BUN SERPL-MCNC: 10 MG/DL — SIGNIFICANT CHANGE UP (ref 7–23)
CALCIUM SERPL-MCNC: 8.9 MG/DL — SIGNIFICANT CHANGE UP (ref 8.4–10.5)
CHLORIDE SERPL-SCNC: 101 MMOL/L — SIGNIFICANT CHANGE UP (ref 96–108)
CO2 SERPL-SCNC: 21 MMOL/L — LOW (ref 22–31)
CREAT SERPL-MCNC: 0.57 MG/DL — SIGNIFICANT CHANGE UP (ref 0.5–1.3)
EOSINOPHIL # BLD AUTO: 0.18 K/UL — SIGNIFICANT CHANGE UP (ref 0–0.5)
EOSINOPHIL NFR BLD AUTO: 1.8 % — SIGNIFICANT CHANGE UP (ref 0–6)
GLUCOSE SERPL-MCNC: 98 MG/DL — SIGNIFICANT CHANGE UP (ref 70–99)
HCT VFR BLD CALC: 42.7 % — SIGNIFICANT CHANGE UP (ref 34.5–45)
HGB BLD-MCNC: 13.7 G/DL — SIGNIFICANT CHANGE UP (ref 11.5–15.5)
LYMPHOCYTES # BLD AUTO: 2.89 K/UL — SIGNIFICANT CHANGE UP (ref 1–3.3)
LYMPHOCYTES # BLD AUTO: 29.2 % — SIGNIFICANT CHANGE UP (ref 13–44)
MCHC RBC-ENTMCNC: 25.5 PG — LOW (ref 27–34)
MCHC RBC-ENTMCNC: 32.1 GM/DL — SIGNIFICANT CHANGE UP (ref 32–36)
MCV RBC AUTO: 79.4 FL — LOW (ref 80–100)
MONOCYTES # BLD AUTO: 1.32 K/UL — HIGH (ref 0–0.9)
MONOCYTES NFR BLD AUTO: 13.3 % — SIGNIFICANT CHANGE UP (ref 2–14)
NEUTROPHILS # BLD AUTO: 5.51 K/UL — SIGNIFICANT CHANGE UP (ref 1.8–7.4)
NEUTROPHILS NFR BLD AUTO: 55.7 % — SIGNIFICANT CHANGE UP (ref 43–77)
PLATELET # BLD AUTO: 251 K/UL — SIGNIFICANT CHANGE UP (ref 150–400)
POTASSIUM SERPL-MCNC: 3.8 MMOL/L — SIGNIFICANT CHANGE UP (ref 3.5–5.3)
POTASSIUM SERPL-SCNC: 3.8 MMOL/L — SIGNIFICANT CHANGE UP (ref 3.5–5.3)
PROT SERPL-MCNC: 7.5 G/DL — SIGNIFICANT CHANGE UP (ref 6–8.3)
RBC # BLD: 5.38 M/UL — HIGH (ref 3.8–5.2)
RBC # FLD: 14.1 % — SIGNIFICANT CHANGE UP (ref 10.3–14.5)
SODIUM SERPL-SCNC: 137 MMOL/L — SIGNIFICANT CHANGE UP (ref 135–145)
WBC # BLD: 9.9 K/UL — SIGNIFICANT CHANGE UP (ref 3.8–10.5)
WBC # FLD AUTO: 9.9 K/UL — SIGNIFICANT CHANGE UP (ref 3.8–10.5)

## 2020-03-17 PROCEDURE — 99233 SBSQ HOSP IP/OBS HIGH 50: CPT

## 2020-03-17 RX ORDER — BENZOCAINE AND MENTHOL 5; 1 G/100ML; G/100ML
1 LIQUID ORAL THREE TIMES A DAY
Refills: 0 | Status: DISCONTINUED | OUTPATIENT
Start: 2020-03-17 | End: 2020-03-23

## 2020-03-17 RX ADMIN — Medication 1 TABLET(S): at 14:25

## 2020-03-17 RX ADMIN — Medication 1 MILLIGRAM(S): at 22:36

## 2020-03-17 RX ADMIN — ENOXAPARIN SODIUM 40 MILLIGRAM(S): 100 INJECTION SUBCUTANEOUS at 14:25

## 2020-03-17 RX ADMIN — GABAPENTIN 300 MILLIGRAM(S): 400 CAPSULE ORAL at 14:25

## 2020-03-17 RX ADMIN — Medication 200 MILLIGRAM(S): at 17:40

## 2020-03-17 RX ADMIN — Medication 1000 UNIT(S): at 14:25

## 2020-03-17 NOTE — PROGRESS NOTE ADULT - SUBJECTIVE AND OBJECTIVE BOX
[facility] Division of Hospital Medicine  Torie Garcia MD  Pager (M-F, 8A-5P): [phone]  Other Times:  [phone]    Patient is a 55y old  Female who presents with a chief complaint of Fever (16 Mar 2020 20:36)      SUBJECTIVE / OVERNIGHT EVENTS:  c/o right arm pain and throat pain. afebrile. minimal cough.      ADDITIONAL REVIEW OF SYSTEMS:    MEDICATIONS  (STANDING):  cholecalciferol 1000 Unit(s) Oral daily  enoxaparin Injectable 40 milliGRAM(s) SubCutaneous daily  gabapentin 300 milliGRAM(s) Oral daily  melatonin 1 milliGRAM(s) Oral at bedtime  multivitamin 1 Tablet(s) Oral daily    MEDICATIONS  (PRN):  acetaminophen   Tablet .. 975 milliGRAM(s) Oral every 6 hours PRN Mild Pain (1 - 3)  benzocaine 15 mG/menthol 3.6 mG (Sugar-Free) Lozenge 1 Lozenge Oral three times a day PRN Sore Throat      CAPILLARY BLOOD GLUCOSE        I&O's Summary    16 Mar 2020 07:01  -  17 Mar 2020 07:00  --------------------------------------------------------  IN: 600 mL / OUT: 650 mL / NET: -50 mL    17 Mar 2020 07:01  -  17 Mar 2020 15:04  --------------------------------------------------------  IN: 720 mL / OUT: 0 mL / NET: 720 mL        PHYSICAL EXAM:  Vital Signs Last 24 Hrs  T(C): 37.7 (17 Mar 2020 11:05), Max: 37.7 (17 Mar 2020 11:05)  T(F): 99.9 (17 Mar 2020 11:05), Max: 99.9 (17 Mar 2020 11:05)  HR: 85 (17 Mar 2020 11:05) (79 - 85)  BP: 124/78 (17 Mar 2020 11:05) (98/86 - 124/78)  BP(mean): --  RR: 17 (17 Mar 2020 11:05) (17 - 19)  SpO2: 95% (17 Mar 2020 11:05) (94% - 95%)    CONSTITUTIONAL: NAD, obese  EYES:  conjunctiva and sclera clear  ENMT: dry oral mucosa  NECK: Supple, no palpable masses; no JVD  RESPIRATORY: Normal respiratory effort, bibasilar rhonchi  CARDIOVASCULAR: Regular rate and rhythm, normal S1 and S2, no murmur/rub/gallop; trace LE edema  ABDOMEN: Nontender to palpation, normoactive bowel sounds, no rebound/guarding  MUSCULOSKELETAL:  dec RUE range of motion sec to pain in elbow. no tenderness or edema.   PSYCH: A+O to person, place, and time; affect appropriate  SKIN: No rashes; no palpable lesions      LABS:                        13.7   9.90  )-----------( 251      ( 17 Mar 2020 05:32 )             42.7     03-17    137  |  101  |  10  ----------------------------<  98  3.8   |  21<L>  |  0.57    Ca    8.9      17 Mar 2020 05:32    TPro  7.5  /  Alb  3.3  /  TBili  0.2  /  DBili  x   /  AST  23  /  ALT  19  /  AlkPhos  86  03-17                RADIOLOGY & ADDITIONAL TESTS:  Results Reviewed:   Imaging Personally Reviewed:  Electrocardiogram Personally Reviewed:    COORDINATION OF CARE:  Care Discussed with Consultants/Other Providers [Y/N]:  Prior or Outpatient Records Reviewed [Y/N]:

## 2020-03-17 NOTE — PROGRESS NOTE ADULT - PROBLEM SELECTOR PLAN 1
COVID + confirmed. CT chest with Nonspecific ground glass opacities   - Isolation, Droplet, Contact, Airborne precautions  - off emperic CTX/ Azithro   - Quant gold neg.

## 2020-03-18 DIAGNOSIS — K21.9 GASTRO-ESOPHAGEAL REFLUX DISEASE WITHOUT ESOPHAGITIS: ICD-10-CM

## 2020-03-18 DIAGNOSIS — J02.9 ACUTE PHARYNGITIS, UNSPECIFIED: ICD-10-CM

## 2020-03-18 DIAGNOSIS — J03.90 ACUTE TONSILLITIS, UNSPECIFIED: ICD-10-CM

## 2020-03-18 PROCEDURE — 99233 SBSQ HOSP IP/OBS HIGH 50: CPT

## 2020-03-18 PROCEDURE — 99232 SBSQ HOSP IP/OBS MODERATE 35: CPT

## 2020-03-18 PROCEDURE — 31575 DIAGNOSTIC LARYNGOSCOPY: CPT

## 2020-03-18 PROCEDURE — 99223 1ST HOSP IP/OBS HIGH 75: CPT | Mod: 25

## 2020-03-18 RX ORDER — AMPICILLIN SODIUM AND SULBACTAM SODIUM 250; 125 MG/ML; MG/ML
3 INJECTION, POWDER, FOR SUSPENSION INTRAMUSCULAR; INTRAVENOUS ONCE
Refills: 0 | Status: COMPLETED | OUTPATIENT
Start: 2020-03-18 | End: 2020-03-18

## 2020-03-18 RX ORDER — HYDROXYCHLOROQUINE SULFATE 200 MG
400 TABLET ORAL
Refills: 0 | Status: COMPLETED | OUTPATIENT
Start: 2020-03-18 | End: 2020-03-19

## 2020-03-18 RX ORDER — HYDROXYCHLOROQUINE SULFATE 200 MG
200 TABLET ORAL
Refills: 0 | Status: DISCONTINUED | OUTPATIENT
Start: 2020-03-19 | End: 2020-03-19

## 2020-03-18 RX ORDER — DIPHENHYDRAMINE HYDROCHLORIDE AND LIDOCAINE HYDROCHLORIDE AND ALUMINUM HYDROXIDE AND MAGNESIUM HYDRO
10 KIT EVERY 8 HOURS
Refills: 0 | Status: DISCONTINUED | OUTPATIENT
Start: 2020-03-18 | End: 2020-03-23

## 2020-03-18 RX ORDER — AMPICILLIN SODIUM AND SULBACTAM SODIUM 250; 125 MG/ML; MG/ML
3 INJECTION, POWDER, FOR SUSPENSION INTRAMUSCULAR; INTRAVENOUS EVERY 6 HOURS
Refills: 0 | Status: DISCONTINUED | OUTPATIENT
Start: 2020-03-18 | End: 2020-03-22

## 2020-03-18 RX ORDER — AMPICILLIN SODIUM AND SULBACTAM SODIUM 250; 125 MG/ML; MG/ML
INJECTION, POWDER, FOR SUSPENSION INTRAMUSCULAR; INTRAVENOUS
Refills: 0 | Status: DISCONTINUED | OUTPATIENT
Start: 2020-03-18 | End: 2020-03-22

## 2020-03-18 RX ADMIN — Medication 1 MILLIGRAM(S): at 22:51

## 2020-03-18 RX ADMIN — Medication 1000 UNIT(S): at 11:43

## 2020-03-18 RX ADMIN — Medication 200 MILLIGRAM(S): at 22:52

## 2020-03-18 RX ADMIN — AMPICILLIN SODIUM AND SULBACTAM SODIUM 200 GRAM(S): 250; 125 INJECTION, POWDER, FOR SUSPENSION INTRAMUSCULAR; INTRAVENOUS at 17:18

## 2020-03-18 RX ADMIN — Medication 1 TABLET(S): at 11:43

## 2020-03-18 RX ADMIN — DIPHENHYDRAMINE HYDROCHLORIDE AND LIDOCAINE HYDROCHLORIDE AND ALUMINUM HYDROXIDE AND MAGNESIUM HYDRO 10 MILLILITER(S): KIT at 22:50

## 2020-03-18 RX ADMIN — Medication 975 MILLIGRAM(S): at 22:52

## 2020-03-18 RX ADMIN — Medication 975 MILLIGRAM(S): at 23:22

## 2020-03-18 RX ADMIN — ENOXAPARIN SODIUM 40 MILLIGRAM(S): 100 INJECTION SUBCUTANEOUS at 11:43

## 2020-03-18 RX ADMIN — AMPICILLIN SODIUM AND SULBACTAM SODIUM 200 GRAM(S): 250; 125 INJECTION, POWDER, FOR SUSPENSION INTRAMUSCULAR; INTRAVENOUS at 14:34

## 2020-03-18 RX ADMIN — GABAPENTIN 300 MILLIGRAM(S): 400 CAPSULE ORAL at 11:43

## 2020-03-18 NOTE — CONSULT NOTE ADULT - SUBJECTIVE AND OBJECTIVE BOX
CC: dysphagia, odynophagia    HPI: 56yo female with PMHx fibromyalgia, HTN, admitted for fever, found to be positive for COVID-19. ENT called to evaluate for dysphagia, odynophagia, and enlarged tonsils per team. Pt states she has persistent sore throat and difficulty swallowing x 3 days. She c/o mild, intermittent dry cough. Pt denies fever, chills, n/v, HA, SOB, stridor, hemoptysis, hoarseness. Pt is currently afebrile, last dose of tylenol 10/15. Chest CT from 3/14 showed nonspecific ground glass opacities, consistent with viral PNA. WBC 9.9, trending down.       PAST MEDICAL & SURGICAL HISTORY:  Fibromyalgia  Hypertension    Allergies    No Known Allergies    Intolerances      MEDICATIONS  (STANDING):  ampicillin/sulbactam  IVPB      ampicillin/sulbactam  IVPB 3 Gram(s) IV Intermittent every 6 hours  cholecalciferol 1000 Unit(s) Oral daily  enoxaparin Injectable 40 milliGRAM(s) SubCutaneous daily  gabapentin 300 milliGRAM(s) Oral daily  melatonin 1 milliGRAM(s) Oral at bedtime  multivitamin 1 Tablet(s) Oral daily    MEDICATIONS  (PRN):  acetaminophen   Tablet .. 975 milliGRAM(s) Oral every 6 hours PRN Mild Pain (1 - 3)  benzocaine 15 mG/menthol 3.6 mG (Sugar-Free) Lozenge 1 Lozenge Oral three times a day PRN Sore Throat  guaiFENesin   Syrup  (Sugar-Free) 200 milliGRAM(s) Oral every 6 hours PRN Cough      Social History: denies tobacco use    Family history: Pt denies any significant family history     ROS:   ENT: all negative except as noted in HPI   CV: denies palpitations  Pulm: denies SOB, hemoptysis  GI: denies change in apetite, indigestion, n/v  : denies pertinent urinary symptoms, urgency  Neuro: denies numbness/tingling, loss of sensation  Psych: denies anxiety  MS: denies muscle weakness, instability  Heme: denies easy bruising or bleeding  Endo: denies heat/cold intolerance, excessive sweating  Vascular: denies LE edema    Vital Signs Last 24 Hrs  T(C): 37.6 (18 Mar 2020 11:25), Max: 37.6 (18 Mar 2020 11:25)  T(F): 99.6 (18 Mar 2020 11:25), Max: 99.6 (18 Mar 2020 11:25)  HR: 90 (18 Mar 2020 12:00) (81 - 128)  BP: 128/70 (18 Mar 2020 11:25) (114/70 - 128/70)  BP(mean): --  RR: 17 (18 Mar 2020 11:25) (16 - 18)  SpO2: 92% (18 Mar 2020 11:25) (92% - 95%)                          13.7   9.90  )-----------( 251      ( 17 Mar 2020 05:32 )             42.7    03-17    137  |  101  |  10  ----------------------------<  98  3.8   |  21<L>  |  0.57    Ca    8.9      17 Mar 2020 05:32    TPro  7.5  /  Alb  3.3  /  TBili  0.2  /  DBili  x   /  AST  23  /  ALT  19  /  AlkPhos  86  03-17       PHYSICAL EXAM:  pending      Fiberoptic Indirect laryngoscopy:  (Scope #2 used)  pending      IMAGING/ADDITIONAL STUDIES:   < from: CT Chest No Cont (03.14.20 @ 07:21) >  IMPRESSION:     Nonspecific ground glass opacities predominantly in a peripheral distribution may be seen in the setting of viral pneumonia (e.g. COVID-19).    < end of copied text > CC: dysphagia, odynophagia    HPI: 56yo female with PMHx fibromyalgia, HTN, admitted for fever, found to be positive for COVID-19. ENT called to evaluate for dysphagia, odynophagia, and enlarged tonsils per team. Pt states she has persistent sore throat and difficulty swallowing x 3 days. She c/o mild, intermittent dry cough. Pt denies fever, chills, n/v, HA, SOB, stridor, hemoptysis, hoarseness. Pt is currently afebrile, last dose of tylenol 10/15. Chest CT from 3/14 showed nonspecific ground glass opacities, consistent with viral PNA. WBC 9.9, trending down.       PAST MEDICAL & SURGICAL HISTORY:  Fibromyalgia  Hypertension    Allergies    No Known Allergies    Intolerances      MEDICATIONS  (STANDING):  ampicillin/sulbactam  IVPB      ampicillin/sulbactam  IVPB 3 Gram(s) IV Intermittent every 6 hours  cholecalciferol 1000 Unit(s) Oral daily  enoxaparin Injectable 40 milliGRAM(s) SubCutaneous daily  gabapentin 300 milliGRAM(s) Oral daily  melatonin 1 milliGRAM(s) Oral at bedtime  multivitamin 1 Tablet(s) Oral daily    MEDICATIONS  (PRN):  acetaminophen   Tablet .. 975 milliGRAM(s) Oral every 6 hours PRN Mild Pain (1 - 3)  benzocaine 15 mG/menthol 3.6 mG (Sugar-Free) Lozenge 1 Lozenge Oral three times a day PRN Sore Throat  guaiFENesin   Syrup  (Sugar-Free) 200 milliGRAM(s) Oral every 6 hours PRN Cough      Social History: denies tobacco use    Family history: Pt denies any significant family history     ROS:   ENT: all negative except as noted in HPI   CV: denies palpitations  Pulm: denies SOB, hemoptysis  GI: denies change in apetite, indigestion, n/v  : denies pertinent urinary symptoms, urgency  Neuro: denies numbness/tingling, loss of sensation  Psych: denies anxiety  MS: denies muscle weakness, instability  Heme: denies easy bruising or bleeding  Endo: denies heat/cold intolerance, excessive sweating  Vascular: denies LE edema    Vital Signs Last 24 Hrs  T(C): 37.6 (18 Mar 2020 11:25), Max: 37.6 (18 Mar 2020 11:25)  T(F): 99.6 (18 Mar 2020 11:25), Max: 99.6 (18 Mar 2020 11:25)  HR: 90 (18 Mar 2020 12:00) (81 - 128)  BP: 128/70 (18 Mar 2020 11:25) (114/70 - 128/70)  BP(mean): --  RR: 17 (18 Mar 2020 11:25) (16 - 18)  SpO2: 92% (18 Mar 2020 11:25) (92% - 95%)                          13.7   9.90  )-----------( 251      ( 17 Mar 2020 05:32 )             42.7    03-17    137  |  101  |  10  ----------------------------<  98  3.8   |  21<L>  |  0.57    Ca    8.9      17 Mar 2020 05:32    TPro  7.5  /  Alb  3.3  /  TBili  0.2  /  DBili  x   /  AST  23  /  ALT  19  /  AlkPhos  86  03-17       PHYSICAL EXAM:  Gen: NAD  Skin: No rashes, bruises, or lesions  Head: Normocephalic, Atraumatic  Face: no edema, erythema, or fluctuance. Parotid glands soft without mass  Eyes: no scleral injection  Ears: Right - ear canal clear, TM intact without effusion or erythema. No evidence of any fluid drainage. No mastoid tenderness, erythema, or ear bulging            Left - ear canal clear, TM intact without effusion or erythema. No evidence of any fluid drainage. No mastoid tenderness, erythema, or ear bulging  Nose: Nares bilaterally patent, no discharge  Mouth: No Stridor / Drooling / Trismus.  Mucosa moist, tongue/uvula midline. + exudates noted along posterior pharyngeal wall, + b/l tonsillar erythema   Neck: Flat, supple, no lymphadenopathy, trachea midline, no masses  Lymphatic: No lymphadenopathy  Resp: breathing easily, no stridor  CV: no peripheral edema/cyanosis  GI: nondistended   Peripheral vascular: no JVD or edema  Neuro: facial nerve intact, no facial droop        Fiberoptic Indirect laryngoscopy:  (Scope #2 used)  Reason for Laryngoscopy: odynophagia, dysphagia     Patient was unable to cooperate with mirror.  Nasopharynx clear, no bleeding. + exudates noted along posterior pharyngeal wall. Tongue base, vallecula, epiglottis, and subglottis appear normal. No erythema, edema, pooling of secretions, masses or lesions. Airway patent, no foreign body visualized. + post cricoid and arytenoid erythema and edema consistent with GERD. Otherwise, no glottic/supraglottic edema. True vocal cords, arytenoids, vestibular folds, ventricles, pyriform sinuses, and aryepiglottic folds appear normal bilaterally. Vocal cords mobile with good contact b/l.            IMAGING/ADDITIONAL STUDIES:   < from: CT Chest No Cont (03.14.20 @ 07:21) >  IMPRESSION:     Nonspecific ground glass opacities predominantly in a peripheral distribution may be seen in the setting of viral pneumonia (e.g. COVID-19).    < end of copied text >

## 2020-03-18 NOTE — CONSULT NOTE ADULT - PROBLEM SELECTOR RECOMMENDATION 9
- continue abx   - soft diet as tolerated  - hydration - continue abx   - magic mouthwash  - soft diet as tolerated  - hydration  - consider CT if sx do not improve  - call with questions x [phone]

## 2020-03-18 NOTE — CONSULT NOTE ADULT - ASSESSMENT
56yo female with COVID-19 viral pneumonia, c/o oydnophagia and dysphagia. Pending PE and indirect laryngoscopy. 54yo female with COVID-19 viral pneumonia, c/o oydnophagia and dysphagia. On exam and scope, exudates noted along posterior pharyngeal wall. Also noted to have GERD. 56yo female with COVID-19 viral pneumonia, c/o oydnophagia and dysphagia. On exam and scope, exudates noted along posterior pharyngeal wall. Also noted to have GERD. Continue abx and supportive treatment. Pharyngitis likely viral, however, if sx do not improve, may consider CT.

## 2020-03-18 NOTE — PROGRESS NOTE ADULT - SUBJECTIVE AND OBJECTIVE BOX
[facility] Division of Hospital Medicine  Torie Garcia MD  Pager (AAKASH-F, 5P-5P): [phone]  Other Times:  [phone]    Patient is a 55y old  Female who presents with a chief complaint of Fever (17 Mar 2020 10:00)      SUBJECTIVE / OVERNIGHT EVENTS:  c/o throat pain. difficulty swallowing. afebrile.   c/o dyspnea.+coughing    ADDITIONAL REVIEW OF SYSTEMS:    MEDICATIONS  (STANDING):  ampicillin/sulbactam  IVPB      ampicillin/sulbactam  IVPB 3 Gram(s) IV Intermittent every 6 hours  cholecalciferol 1000 Unit(s) Oral daily  enoxaparin Injectable 40 milliGRAM(s) SubCutaneous daily  gabapentin 300 milliGRAM(s) Oral daily  melatonin 1 milliGRAM(s) Oral at bedtime  multivitamin 1 Tablet(s) Oral daily    MEDICATIONS  (PRN):  acetaminophen   Tablet .. 975 milliGRAM(s) Oral every 6 hours PRN Mild Pain (1 - 3)  benzocaine 15 mG/menthol 3.6 mG (Sugar-Free) Lozenge 1 Lozenge Oral three times a day PRN Sore Throat  guaiFENesin   Syrup  (Sugar-Free) 200 milliGRAM(s) Oral every 6 hours PRN Cough      CAPILLARY BLOOD GLUCOSE        I&O's Summary    17 Mar 2020 07:01  -  18 Mar 2020 07:00  --------------------------------------------------------  IN: 720 mL / OUT: 0 mL / NET: 720 mL    18 Mar 2020 07:01  -  18 Mar 2020 15:41  --------------------------------------------------------  IN: 860 mL / OUT: 0 mL / NET: 860 mL        PHYSICAL EXAM:  Vital Signs Last 24 Hrs  T(C): 37.6 (18 Mar 2020 11:25), Max: 37.6 (18 Mar 2020 11:25)  T(F): 99.6 (18 Mar 2020 11:25), Max: 99.6 (18 Mar 2020 11:25)  HR: 90 (18 Mar 2020 12:00) (81 - 128)  BP: 128/70 (18 Mar 2020 11:25) (114/70 - 128/70)  BP(mean): --  RR: 17 (18 Mar 2020 11:25) (16 - 18)  SpO2: 92% (18 Mar 2020 11:25) (92% - 95%)    CONSTITUTIONAL: NAD, obese  EYES:  conjunctiva and sclera clear  ENMT: dry oral mucosa,  +tonsillar enlargement  NECK: tenderness, no palpable masses; no JVD  RESPIRATORY: Normal respiratory effort, bibasilar rhonchi  CARDIOVASCULAR: Regular rate and rhythm, normal S1 and S2, no murmur/rub/gallop; trace LE edema  ABDOMEN: Nontender to palpation, normoactive bowel sounds, no rebound/guarding  MUSCULOSKELETAL:  dec RUE range of motion sec to pain in elbow. no tenderness or edema.   PSYCH: A+O to person, place, and time; affect appropriate  SKIN: No rashes; no palpable lesions    LABS:                        13.7   9.90  )-----------( 251      ( 17 Mar 2020 05:32 )             42.7     03-17    137  |  101  |  10  ----------------------------<  98  3.8   |  21<L>  |  0.57    Ca    8.9      17 Mar 2020 05:32    TPro  7.5  /  Alb  3.3  /  TBili  0.2  /  DBili  x   /  AST  23  /  ALT  19  /  AlkPhos  86  03-17                RADIOLOGY & ADDITIONAL TESTS:  Results Reviewed:   Imaging Personally Reviewed:  Electrocardiogram Personally Reviewed:    COORDINATION OF CARE:  Care Discussed with Consultants/Other Providers [Y/N]:  Prior or Outpatient Records Reviewed [Y/N]:

## 2020-03-18 NOTE — PROGRESS NOTE ADULT - SUBJECTIVE AND OBJECTIVE BOX
INFECTIOUS DISEASES FOLLOW UP-- Ivory Forrest  [phone]    This is a follow up note for this  55yFemale with  Acute pharyngitis  COVID-19+  no interval change in condition over past 24 hours      ROS:  CONSTITUTIONAL: remains with dry cough, weak, anorexic    Allergies    No Known Allergies    Intolerances        ANTIBIOTICS/RELEVANT:  antimicrobials  ampicillin/sulbactam  IVPB      ampicillin/sulbactam  IVPB 3 Gram(s) IV Intermittent every 6 hours    immunologic:    OTHER:  acetaminophen   Tablet .. 975 milliGRAM(s) Oral every 6 hours PRN  benzocaine 15 mG/menthol 3.6 mG (Sugar-Free) Lozenge 1 Lozenge Oral three times a day PRN  cholecalciferol 1000 Unit(s) Oral daily  enoxaparin Injectable 40 milliGRAM(s) SubCutaneous daily  FIRST- Mouthwash  BLM 10 milliLiter(s) Swish and Spit every 8 hours  gabapentin 300 milliGRAM(s) Oral daily  guaiFENesin   Syrup  (Sugar-Free) 200 milliGRAM(s) Oral every 6 hours PRN  melatonin 1 milliGRAM(s) Oral at bedtime  multivitamin 1 Tablet(s) Oral daily      Objective:  Vital Signs Last 24 Hrs  T(C): 36.9 (18 Mar 2020 20:47), Max: 37.6 (18 Mar 2020 11:25)  T(F): 98.5 (18 Mar 2020 20:47), Max: 99.6 (18 Mar 2020 11:25)  HR: 88 (18 Mar 2020 20:47) (82 - 128)  BP: 115/70 (18 Mar 2020 20:47) (115/70 - 128/70)  BP(mean): --  RR: 16 (18 Mar 2020 20:47) (16 - 17)  SpO2: 96% (18 Mar 2020 20:47) (92% - 96%)    PHYSICAL EXAM:  Constitutional:no acute distress but coughing with prolonged conversation  Eyes:MILTON, EOMI  Ear/Nose/Throat: no oral lesions, ?post phayrngitis seen by ENT 	  Respiratory: crackles bilateral bases  Cardiovascular: S1S2  Gastrointestinal:soft, (+) BS, no tenderness  Extremities:no e/e/c  No Lymphadenopathy  IV sites not inflammed.    LABS:                        13.7   9.90  )-----------( 251      ( 17 Mar 2020 05:32 )             42.7     03-17    137  |  101  |  10  ----------------------------<  98  3.8   |  21<L>  |  0.57    Ca    8.9      17 Mar 2020 05:32    TPro  7.5  /  Alb  3.3  /  TBili  0.2  /  DBili  x   /  AST  23  /  ALT  19  /  AlkPhos  86  03-17          MICROBIOLOGY:            RECENT CULTURES:  03-14 @ 09:14  .Throat  --  --  --    No Streptococcus pyogenes (Group A) isolated  --  03-14 @ 06:54  .Blood Blood-Peripheral  --  --  --    No growth to date.  --      RADIOLOGY & ADDITIONAL STUDIES:    < from: CT Chest No Cont (03.14.20 @ 07:21) >    IMPRESSION:     Nonspecific ground glass opacities predominantly in a peripheral distribution may be seen in the setting of viral pneumonia (e.g. COVID-19).    < end of copied text >

## 2020-03-18 NOTE — PROGRESS NOTE ADULT - ASSESSMENT
55 year old female with a history of fibromyalgia, HTN, seasonal allergies who is presenting with 10 days of fever, found to have CT Chest findings of peripheral GGO.s c/f possible viral pneumonia.    Fevers, ground glass opacities  -positive COVID 19 PCR  -RVP neg  -f/u urine legionella was negative  antibiotics were discontinued  She is not eligible for remdisivir studies based upon duration of illness  - will start hydroxychloroquine for moderate COVID-19 disease 400mg bid for the first day then 200mg bid days 2-5        Gagandeep Forrest MD  [phone]  After 5pm/weekends [phone]

## 2020-03-19 DIAGNOSIS — J02.9 ACUTE PHARYNGITIS, UNSPECIFIED: ICD-10-CM

## 2020-03-19 LAB
BASOPHILS # BLD AUTO: 0.04 K/UL — SIGNIFICANT CHANGE UP (ref 0–0.2)
BASOPHILS NFR BLD AUTO: 0.4 % — SIGNIFICANT CHANGE UP (ref 0–2)
CULTURE RESULTS: SIGNIFICANT CHANGE UP
CULTURE RESULTS: SIGNIFICANT CHANGE UP
EOSINOPHIL # BLD AUTO: 0.2 K/UL — SIGNIFICANT CHANGE UP (ref 0–0.5)
EOSINOPHIL NFR BLD AUTO: 2.2 % — SIGNIFICANT CHANGE UP (ref 0–6)
HCT VFR BLD CALC: 41.4 % — SIGNIFICANT CHANGE UP (ref 34.5–45)
HGB BLD-MCNC: 13 G/DL — SIGNIFICANT CHANGE UP (ref 11.5–15.5)
IMM GRANULOCYTES NFR BLD AUTO: 0.8 % — SIGNIFICANT CHANGE UP (ref 0–1.5)
LYMPHOCYTES # BLD AUTO: 3.66 K/UL — HIGH (ref 1–3.3)
LYMPHOCYTES # BLD AUTO: 41.2 % — SIGNIFICANT CHANGE UP (ref 13–44)
MCHC RBC-ENTMCNC: 25.1 PG — LOW (ref 27–34)
MCHC RBC-ENTMCNC: 31.4 GM/DL — LOW (ref 32–36)
MCV RBC AUTO: 80.1 FL — SIGNIFICANT CHANGE UP (ref 80–100)
MONOCYTES # BLD AUTO: 0.92 K/UL — HIGH (ref 0–0.9)
MONOCYTES NFR BLD AUTO: 10.3 % — SIGNIFICANT CHANGE UP (ref 2–14)
NEUTROPHILS # BLD AUTO: 4 K/UL — SIGNIFICANT CHANGE UP (ref 1.8–7.4)
NEUTROPHILS NFR BLD AUTO: 45.1 % — SIGNIFICANT CHANGE UP (ref 43–77)
NRBC # BLD: 0 /100 WBCS — SIGNIFICANT CHANGE UP (ref 0–0)
PLATELET # BLD AUTO: 304 K/UL — SIGNIFICANT CHANGE UP (ref 150–400)
RBC # BLD: 5.17 M/UL — SIGNIFICANT CHANGE UP (ref 3.8–5.2)
RBC # FLD: 13.7 % — SIGNIFICANT CHANGE UP (ref 10.3–14.5)
SPECIMEN SOURCE: SIGNIFICANT CHANGE UP
SPECIMEN SOURCE: SIGNIFICANT CHANGE UP
WBC # BLD: 8.89 K/UL — SIGNIFICANT CHANGE UP (ref 3.8–10.5)
WBC # FLD AUTO: 8.89 K/UL — SIGNIFICANT CHANGE UP (ref 3.8–10.5)

## 2020-03-19 PROCEDURE — 99233 SBSQ HOSP IP/OBS HIGH 50: CPT

## 2020-03-19 RX ORDER — GABAPENTIN 400 MG/1
300 CAPSULE ORAL
Refills: 0 | Status: DISCONTINUED | OUTPATIENT
Start: 2020-03-19 | End: 2020-03-23

## 2020-03-19 RX ORDER — AMITRIPTYLINE HCL 25 MG
10 TABLET ORAL DAILY
Refills: 0 | Status: DISCONTINUED | OUTPATIENT
Start: 2020-03-19 | End: 2020-03-23

## 2020-03-19 RX ORDER — PANTOPRAZOLE SODIUM 20 MG/1
40 TABLET, DELAYED RELEASE ORAL
Refills: 0 | Status: DISCONTINUED | OUTPATIENT
Start: 2020-03-19 | End: 2020-03-23

## 2020-03-19 RX ORDER — CYCLOBENZAPRINE HYDROCHLORIDE 10 MG/1
10 TABLET, FILM COATED ORAL ONCE
Refills: 0 | Status: COMPLETED | OUTPATIENT
Start: 2020-03-19 | End: 2020-03-19

## 2020-03-19 RX ORDER — OXYCODONE HYDROCHLORIDE 5 MG/1
5 TABLET ORAL EVERY 4 HOURS
Refills: 0 | Status: DISCONTINUED | OUTPATIENT
Start: 2020-03-19 | End: 2020-03-23

## 2020-03-19 RX ORDER — HYDROXYCHLOROQUINE SULFATE 200 MG
200 TABLET ORAL
Refills: 0 | Status: DISCONTINUED | OUTPATIENT
Start: 2020-03-20 | End: 2020-03-23

## 2020-03-19 RX ADMIN — AMPICILLIN SODIUM AND SULBACTAM SODIUM 200 GRAM(S): 250; 125 INJECTION, POWDER, FOR SUSPENSION INTRAMUSCULAR; INTRAVENOUS at 01:15

## 2020-03-19 RX ADMIN — OXYCODONE HYDROCHLORIDE 5 MILLIGRAM(S): 5 TABLET ORAL at 21:37

## 2020-03-19 RX ADMIN — GABAPENTIN 300 MILLIGRAM(S): 400 CAPSULE ORAL at 12:04

## 2020-03-19 RX ADMIN — AMPICILLIN SODIUM AND SULBACTAM SODIUM 200 GRAM(S): 250; 125 INJECTION, POWDER, FOR SUSPENSION INTRAMUSCULAR; INTRAVENOUS at 17:01

## 2020-03-19 RX ADMIN — Medication 1000 UNIT(S): at 12:04

## 2020-03-19 RX ADMIN — ENOXAPARIN SODIUM 40 MILLIGRAM(S): 100 INJECTION SUBCUTANEOUS at 12:00

## 2020-03-19 RX ADMIN — Medication 975 MILLIGRAM(S): at 09:10

## 2020-03-19 RX ADMIN — Medication 400 MILLIGRAM(S): at 17:01

## 2020-03-19 RX ADMIN — AMPICILLIN SODIUM AND SULBACTAM SODIUM 200 GRAM(S): 250; 125 INJECTION, POWDER, FOR SUSPENSION INTRAMUSCULAR; INTRAVENOUS at 12:00

## 2020-03-19 RX ADMIN — DIPHENHYDRAMINE HYDROCHLORIDE AND LIDOCAINE HYDROCHLORIDE AND ALUMINUM HYDROXIDE AND MAGNESIUM HYDRO 10 MILLILITER(S): KIT at 06:35

## 2020-03-19 RX ADMIN — Medication 1 MILLIGRAM(S): at 21:37

## 2020-03-19 RX ADMIN — DIPHENHYDRAMINE HYDROCHLORIDE AND LIDOCAINE HYDROCHLORIDE AND ALUMINUM HYDROXIDE AND MAGNESIUM HYDRO 10 MILLILITER(S): KIT at 13:15

## 2020-03-19 RX ADMIN — CYCLOBENZAPRINE HYDROCHLORIDE 10 MILLIGRAM(S): 10 TABLET, FILM COATED ORAL at 17:01

## 2020-03-19 RX ADMIN — AMPICILLIN SODIUM AND SULBACTAM SODIUM 200 GRAM(S): 250; 125 INJECTION, POWDER, FOR SUSPENSION INTRAMUSCULAR; INTRAVENOUS at 06:35

## 2020-03-19 RX ADMIN — Medication 400 MILLIGRAM(S): at 06:34

## 2020-03-19 RX ADMIN — DIPHENHYDRAMINE HYDROCHLORIDE AND LIDOCAINE HYDROCHLORIDE AND ALUMINUM HYDROXIDE AND MAGNESIUM HYDRO 10 MILLILITER(S): KIT at 21:37

## 2020-03-19 RX ADMIN — Medication 975 MILLIGRAM(S): at 09:40

## 2020-03-19 RX ADMIN — OXYCODONE HYDROCHLORIDE 5 MILLIGRAM(S): 5 TABLET ORAL at 22:35

## 2020-03-19 RX ADMIN — GABAPENTIN 300 MILLIGRAM(S): 400 CAPSULE ORAL at 21:37

## 2020-03-19 RX ADMIN — Medication 1 TABLET(S): at 12:04

## 2020-03-19 NOTE — DIETITIAN INITIAL EVALUATION ADULT. - PROBLEM SELECTOR PLAN 1
With CT findings concerning for COVID-19  RVP negative  - Isolation, Droplet, Contact, Airborne precautions  - COVID-19 sent and pending  - Will cover empirically with CTX/ Azithro for now, however if COVID returns positive, should DC.   - Will send urine legionella, sputum  - Procal

## 2020-03-19 NOTE — DIETITIAN INITIAL EVALUATION ADULT. - ADD RECOMMEND
1) Recommend Ensure Enlive Chocolate (consistent with Halal) to increase pt's protein-energy intake. 2) Monitor need for additional supplementation 1) Recommend Ensure Enlive Chocolate (consistent with Halal) to increase pt's protein-energy intake- placed pending verification, notified NP Gale. 2) Monitor need for additional supplementation

## 2020-03-19 NOTE — PROGRESS NOTE ADULT - PROBLEM SELECTOR PLAN 3
Pain now complaining of lower back pain unable to ambulate.   still with right UE pain worse with movement.   patient with h/o fibromyalgia. will increase gabapentin.  1 dose of flexeril and start on oxy 5mg q4 pRN  Will consider amitriptyline.   Recent fall on 3/4 s/p xray as outpt neg.   No evidence of significant bone injury.   Monitor pain control.

## 2020-03-19 NOTE — PROGRESS NOTE ADULT - PROBLEM SELECTOR PLAN 1
COVID + confirmed. stable resp status  - CT chest with Nonspecific ground glass opacities   - Isolation, Droplet, Contact, Airborne precautions  - off emperic CTX/ Azithro   - Quant gold neg.

## 2020-03-19 NOTE — DIETITIAN INITIAL EVALUATION ADULT. - PATIENT MEETS CRITERIA FOR MALNUTRITION
No urine pregnancy test documented in ED. Order entered per protocol. Patient unable to give urine specimen at this time but agrees to take fluids and notify staff when able. Urine specimen cup at bedside. no

## 2020-03-19 NOTE — DIETITIAN INITIAL EVALUATION ADULT. - OTHER INFO
Per chart, 56 y/o presented with 10 days of fever, found to be COVID +. ENT was consulted and states pt has pharyngitis.  PMH:  fibromyalgia, HTN, seasonal allergies    Unable to conduct a face to face interview or nutrition-focused physical exam due to limited contact restrictions related to Pt's medical condition and isolation precautions. Attempted to call patient with [email or web address]  services twice (IDs [id number] and [id number]), however pt with no answer. Information obtained from RN and EMR.    Intake PTA: Unable to assess at this time.     Per chart, NKFA, no vomiting, no micronutrient supplementation PTA, last BM 3/18. Pt seen by ENT for sore throat and difficulty swallowing, and was placed on a soft diet/prescribed mouth wash to aid in symptoms.    Diet: Flow sheets indicate pt with % PO intake during admission, although MD notes report recent decreased PO intake due to sore throat.     Weight Hx: Pt is unable to provide UBW at this time. This admission, the pt's dosing wt is 172 pounds (3/14), and her most recent wt is 169 pounds (3/15). Per chart, 54 y/o presented with 10 days of fever, found to be COVID +. ENT was consulted and states pt has pharyngitis and may conduct CT if issue persists.   PMH:  fibromyalgia, HTN, seasonal allergies    Unable to conduct a face to face interview or nutrition-focused physical exam due to limited contact restrictions related to Pt's medical condition and isolation precautions. Attempted to call patient with [employer]  services twice (IDs [id number] and [phone]), however pt with no answer. Information obtained from RN and EMR.    Intake PTA: Unable to assess at this time.     Per chart, NKFA, no vomiting, no micronutrient supplementation PTA, last BM 3/18. Pt seen by ENT for sore throat and difficulty swallowing, and was placed on a soft diet/prescribed mouth wash to aid with symptoms.    Diet: Flow sheets indicate pt with % PO intake during admission, although MD notes report recent decreased PO intake due to sore throat. RN states pt is tolerating soft diet well.     Weight Hx: Pt is unable to provide UBW at this time. This admission, the pt's dosing wt is 172 pounds (3/14), and her most recent wt is 169 pounds (3/15). Per chart, 56 y/o female presented with 10 days of fever, found to be COVID +. ENT was consulted and states pt has pharyngitis and may conduct CT if issue persists.   PMH:  fibromyalgia, HTN, seasonal allergies    Unable to conduct a face to face interview or nutrition-focused physical exam due to limited contact restrictions related to Pt's medical condition and isolation precautions. Attempted to call patient with [employer]  services twice (IDs [phone] and [phone]), however pt with no answer. Information obtained from RN and EMR.    Intake PTA: Unable to assess at this time.     Per chart, NKFA, no vomiting, no micronutrient supplementation PTA, last BM 3/18. Pt seen by ENT for sore throat and difficulty swallowing, and was placed on a soft diet/prescribed mouth wash to aid with symptoms.    Diet: Flow sheets indicate pt with % PO intake during admission, although MD notes report recent decreased PO intake due to sore throat. RN states pt is tolerating soft diet well.     Weight Hx: Pt is unable to provide UBW at this time. This admission, the pt's dosing wt is 172 pounds (3/14), and her most recent wt is 169 pounds (3/15).

## 2020-03-19 NOTE — DIETITIAN INITIAL EVALUATION ADULT. - PHYSICAL APPEARANCE
other (specify)/Unable to conduct Nutrition-focused physical exam at this time due to isolation precautions. Ht: 61 inches Wt: 169 pounds BMI: 31.9 kg/m2 IBW: 105 pounds(+/-10%) 161%IBW  no edema. no pressure ulcers documented.

## 2020-03-19 NOTE — DIETITIAN INITIAL EVALUATION ADULT. - PROBLEM SELECTOR PLAN 2
Likely hypovolemic, hyponatremia in the setting of viral respiratory illness  - S/P IVF, will follow up repeat BMP in afternoon. Will give free water if exceeds greater than 8 mEq improvement/ 24 hours

## 2020-03-19 NOTE — PROGRESS NOTE ADULT - SUBJECTIVE AND OBJECTIVE BOX
[facility] Division of Hospital Medicine  Torie Garcia MD  Pager ([place], 3L-8Z): [phone]  Other Times:  [phone]    Patient is a 55y old  Female who presents with a chief complaint of Fever (18 Mar 2020 21:46)      SUBJECTIVE / OVERNIGHT EVENTS:  afebrile. c/o back pain and right arm pain/  throat pain slightly better. denies any SOB    ADDITIONAL REVIEW OF SYSTEMS:    MEDICATIONS  (STANDING):  ampicillin/sulbactam  IVPB      ampicillin/sulbactam  IVPB 3 Gram(s) IV Intermittent every 6 hours  cholecalciferol 1000 Unit(s) Oral daily  cyclobenzaprine 10 milliGRAM(s) Oral once  enoxaparin Injectable 40 milliGRAM(s) SubCutaneous daily  FIRST- Mouthwash  BLM 10 milliLiter(s) Swish and Spit every 8 hours  gabapentin 300 milliGRAM(s) Oral two times a day  hydroxychloroquine 400 milliGRAM(s) Oral two times a day  melatonin 1 milliGRAM(s) Oral at bedtime  multivitamin 1 Tablet(s) Oral daily  pantoprazole    Tablet 40 milliGRAM(s) Oral before breakfast    MEDICATIONS  (PRN):  acetaminophen   Tablet .. 975 milliGRAM(s) Oral every 6 hours PRN Mild Pain (1 - 3)  benzocaine 15 mG/menthol 3.6 mG (Sugar-Free) Lozenge 1 Lozenge Oral three times a day PRN Sore Throat  guaiFENesin   Syrup  (Sugar-Free) 200 milliGRAM(s) Oral every 6 hours PRN Cough  oxyCODONE    IR 5 milliGRAM(s) Oral every 4 hours PRN Severe Pain (7 - 10)      CAPILLARY BLOOD GLUCOSE        I&O's Summary    18 Mar 2020 07:01  -  19 Mar 2020 07:00  --------------------------------------------------------  IN: 860 mL / OUT: 0 mL / NET: 860 mL    19 Mar 2020 07:01  -  19 Mar 2020 16:31  --------------------------------------------------------  IN: 980 mL / OUT: 0 mL / NET: 980 mL        PHYSICAL EXAM:  Vital Signs Last 24 Hrs  T(C): 36.7 (19 Mar 2020 11:50), Max: 36.9 (18 Mar 2020 20:47)  T(F): 98 (19 Mar 2020 11:50), Max: 98.5 (18 Mar 2020 20:47)  HR: 67 (19 Mar 2020 11:50) (67 - 88)  BP: 110/64 (19 Mar 2020 11:50) (106/68 - 115/70)  BP(mean): --  RR: 17 (19 Mar 2020 11:50) (16 - 18)  SpO2: 98% (19 Mar 2020 11:50) (96% - 98%)    CONSTITUTIONAL: mod distress from back pain, obese  EYES:  conjunctiva and sclera clear  ENMT: dry oral mucosa,  +tonsillar enlargement  NECK: tenderness, no palpable masses; no JVD  RESPIRATORY: Normal respiratory effort, bibasilar rhonchi  CARDIOVASCULAR: Regular rate and rhythm, normal S1 and S2, no murmur/rub/gallop; trace LE edema  ABDOMEN: Nontender to palpation, normoactive bowel sounds, no rebound/guarding  MUSCULOSKELETAL:  dec ROM lower back and right arm sec to pain   PSYCH: A+O to person, place, and time; affect appropriate  SKIN: No rashes; no palpable lesions    LABS:                        13.0   8.89  )-----------( 304      ( 19 Mar 2020 05:30 )             41.4                       RADIOLOGY & ADDITIONAL TESTS:  Results Reviewed:   Imaging Personally Reviewed:  Electrocardiogram Personally Reviewed:    COORDINATION OF CARE:  Care Discussed with Consultants/Other Providers [Y/N]:  Prior or Outpatient Records Reviewed [Y/N]:

## 2020-03-20 ENCOUNTER — TRANSCRIPTION ENCOUNTER (OUTPATIENT)
Age: 55
End: 2020-03-20

## 2020-03-20 LAB
ANION GAP SERPL CALC-SCNC: 12 MMOL/L — SIGNIFICANT CHANGE UP (ref 5–17)
BASOPHILS # BLD AUTO: 0.03 K/UL — SIGNIFICANT CHANGE UP (ref 0–0.2)
BASOPHILS NFR BLD AUTO: 0.4 % — SIGNIFICANT CHANGE UP (ref 0–2)
BUN SERPL-MCNC: 8 MG/DL — SIGNIFICANT CHANGE UP (ref 7–23)
CALCIUM SERPL-MCNC: 9.6 MG/DL — SIGNIFICANT CHANGE UP (ref 8.4–10.5)
CHLORIDE SERPL-SCNC: 103 MMOL/L — SIGNIFICANT CHANGE UP (ref 96–108)
CO2 SERPL-SCNC: 24 MMOL/L — SIGNIFICANT CHANGE UP (ref 22–31)
CREAT SERPL-MCNC: 0.69 MG/DL — SIGNIFICANT CHANGE UP (ref 0.5–1.3)
EOSINOPHIL # BLD AUTO: 0.28 K/UL — SIGNIFICANT CHANGE UP (ref 0–0.5)
EOSINOPHIL NFR BLD AUTO: 3.7 % — SIGNIFICANT CHANGE UP (ref 0–6)
G6PD RBC-CCNC: 16.4 U/G HGB — SIGNIFICANT CHANGE UP (ref 7–20.5)
GLUCOSE SERPL-MCNC: 84 MG/DL — SIGNIFICANT CHANGE UP (ref 70–99)
HCT VFR BLD CALC: 42 % — SIGNIFICANT CHANGE UP (ref 34.5–45)
HGB BLD-MCNC: 12.9 G/DL — SIGNIFICANT CHANGE UP (ref 11.5–15.5)
IMM GRANULOCYTES NFR BLD AUTO: 0.8 % — SIGNIFICANT CHANGE UP (ref 0–1.5)
LYMPHOCYTES # BLD AUTO: 3.33 K/UL — HIGH (ref 1–3.3)
LYMPHOCYTES # BLD AUTO: 43.8 % — SIGNIFICANT CHANGE UP (ref 13–44)
MCHC RBC-ENTMCNC: 24.9 PG — LOW (ref 27–34)
MCHC RBC-ENTMCNC: 30.7 GM/DL — LOW (ref 32–36)
MCV RBC AUTO: 81.1 FL — SIGNIFICANT CHANGE UP (ref 80–100)
MONOCYTES # BLD AUTO: 0.68 K/UL — SIGNIFICANT CHANGE UP (ref 0–0.9)
MONOCYTES NFR BLD AUTO: 8.9 % — SIGNIFICANT CHANGE UP (ref 2–14)
NEUTROPHILS # BLD AUTO: 3.23 K/UL — SIGNIFICANT CHANGE UP (ref 1.8–7.4)
NEUTROPHILS NFR BLD AUTO: 42.4 % — LOW (ref 43–77)
NRBC # BLD: 0 /100 WBCS — SIGNIFICANT CHANGE UP (ref 0–0)
PLATELET # BLD AUTO: 347 K/UL — SIGNIFICANT CHANGE UP (ref 150–400)
POTASSIUM SERPL-MCNC: 4.1 MMOL/L — SIGNIFICANT CHANGE UP (ref 3.5–5.3)
POTASSIUM SERPL-SCNC: 4.1 MMOL/L — SIGNIFICANT CHANGE UP (ref 3.5–5.3)
RBC # BLD: 5.18 M/UL — SIGNIFICANT CHANGE UP (ref 3.8–5.2)
RBC # FLD: 13.6 % — SIGNIFICANT CHANGE UP (ref 10.3–14.5)
SODIUM SERPL-SCNC: 139 MMOL/L — SIGNIFICANT CHANGE UP (ref 135–145)
WBC # BLD: 7.61 K/UL — SIGNIFICANT CHANGE UP (ref 3.8–10.5)
WBC # FLD AUTO: 7.61 K/UL — SIGNIFICANT CHANGE UP (ref 3.8–10.5)

## 2020-03-20 PROCEDURE — 99232 SBSQ HOSP IP/OBS MODERATE 35: CPT

## 2020-03-20 RX ADMIN — Medication 10 MILLIGRAM(S): at 18:37

## 2020-03-20 RX ADMIN — ENOXAPARIN SODIUM 40 MILLIGRAM(S): 100 INJECTION SUBCUTANEOUS at 19:06

## 2020-03-20 RX ADMIN — DIPHENHYDRAMINE HYDROCHLORIDE AND LIDOCAINE HYDROCHLORIDE AND ALUMINUM HYDROXIDE AND MAGNESIUM HYDRO 10 MILLILITER(S): KIT at 06:46

## 2020-03-20 RX ADMIN — AMPICILLIN SODIUM AND SULBACTAM SODIUM 200 GRAM(S): 250; 125 INJECTION, POWDER, FOR SUSPENSION INTRAMUSCULAR; INTRAVENOUS at 18:39

## 2020-03-20 RX ADMIN — AMPICILLIN SODIUM AND SULBACTAM SODIUM 200 GRAM(S): 250; 125 INJECTION, POWDER, FOR SUSPENSION INTRAMUSCULAR; INTRAVENOUS at 00:20

## 2020-03-20 RX ADMIN — Medication 200 MILLIGRAM(S): at 22:37

## 2020-03-20 RX ADMIN — Medication 1 MILLIGRAM(S): at 22:28

## 2020-03-20 RX ADMIN — PANTOPRAZOLE SODIUM 40 MILLIGRAM(S): 20 TABLET, DELAYED RELEASE ORAL at 06:47

## 2020-03-20 RX ADMIN — GABAPENTIN 300 MILLIGRAM(S): 400 CAPSULE ORAL at 06:47

## 2020-03-20 RX ADMIN — AMPICILLIN SODIUM AND SULBACTAM SODIUM 200 GRAM(S): 250; 125 INJECTION, POWDER, FOR SUSPENSION INTRAMUSCULAR; INTRAVENOUS at 13:19

## 2020-03-20 RX ADMIN — Medication 1 TABLET(S): at 18:38

## 2020-03-20 RX ADMIN — Medication 1000 UNIT(S): at 18:37

## 2020-03-20 RX ADMIN — AMPICILLIN SODIUM AND SULBACTAM SODIUM 200 GRAM(S): 250; 125 INJECTION, POWDER, FOR SUSPENSION INTRAMUSCULAR; INTRAVENOUS at 06:47

## 2020-03-20 RX ADMIN — DIPHENHYDRAMINE HYDROCHLORIDE AND LIDOCAINE HYDROCHLORIDE AND ALUMINUM HYDROXIDE AND MAGNESIUM HYDRO 10 MILLILITER(S): KIT at 22:28

## 2020-03-20 RX ADMIN — DIPHENHYDRAMINE HYDROCHLORIDE AND LIDOCAINE HYDROCHLORIDE AND ALUMINUM HYDROXIDE AND MAGNESIUM HYDRO 10 MILLILITER(S): KIT at 13:19

## 2020-03-20 RX ADMIN — GABAPENTIN 300 MILLIGRAM(S): 400 CAPSULE ORAL at 19:06

## 2020-03-20 RX ADMIN — Medication 200 MILLIGRAM(S): at 06:47

## 2020-03-20 RX ADMIN — Medication 200 MILLIGRAM(S): at 19:06

## 2020-03-20 NOTE — PHYSICAL THERAPY INITIAL EVALUATION ADULT - REHAB POTENTIAL, PT EVAL
good, to achieve stated therapy goals Pt has no acute skilled PT needs at this time, D/C from program, staff can ambulate pt safely, pt verbalized understanding.

## 2020-03-20 NOTE — PROGRESS NOTE ADULT - ASSESSMENT
55 year old female with a history of fibromyalgia, HTN, seasonal allergies who is presenting with 10 days of fever, found to have CT Chest findings of peripheral GGO.s c/f possible viral pneumonia.    Fevers, ground glass opacities  -positive COVID 19 PCR  -RVP neg  -f/u urine legionella was negative  antibiotics were discontinued  She is not eligible for remdisivir studies based upon duration of illness  - will start hydroxychloroquine for moderate COVID-19 disease 400mg bid for the first day then 200mg bid days 2-5    stable at this point for a possible discharge home  will need to complete 5 days of Plaquenil in total        Gagandeep Forrest MD  [phone]  After 5pm/weekends [phone]

## 2020-03-20 NOTE — PHYSICAL THERAPY INITIAL EVALUATION ADULT - CRITERIA FOR SKILLED THERAPEUTIC INTERVENTIONS
predicted duration of therapy intervention/risk reduction/prevention/impairments found/functional limitations in following categories/therapy frequency/anticipated equipment needs at discharge/anticipated discharge recommendation/rehab potential

## 2020-03-20 NOTE — DISCHARGE NOTE PROVIDER - HOSPITAL COURSE
55 year old female with a history of fibromyalgia, HTN, seasonal allergies who is presenting with 10 days of fever and viral symptoms, found to have bilateral GGO concerning for viral pneumonia.    CT of chest with nonspecific ground glass opacities, Found to be COVID 19 positive, Quant gold negative. Recent fall at home s/o RUE pain with movement and back pain, s/p xray as outpatient, negative. H/O Myalgia, gabapentin increased, oxycodone 5 mg PO BID given as needed for pain management. For exudative pharyngitis, evaluated by ENT, Unasyn given. symptoms improved 55 year old female with a history of fibromyalgia, HTN, seasonal allergies who is presenting with 10 days of fever and viral symptoms, found to have bilateral GGO concerning for viral pneumonia.    CT of chest with nonspecific ground glass opacities, Found to be COVID 19 positive, Quant gold negative. Recent fall at home s/o RUE pain with movement and back pain, s/p xray as outpatient, negative. H/O Myalgia, gabapentin increased, oxycodone 5 mg PO BID given as needed for pain management. For exudative pharyngitis, evaluated by ENT, Unasyn given. symptoms improved.    Medically cleared for discharge by Dr. Greenfield 55 year old female with a history of fibromyalgia, HTN, seasonal allergies who is presenting with 10 days of fever and viral symptoms, found to have bilateral GGO concerning for viral pneumonia.    CT of chest with nonspecific ground glass opacities, Found to be COVID 19 positive, Quant gold negative. Recent fall at home s/o RUE pain with movement and back pain, s/p xray as outpatient, negative. H/O Myalgia, gabapentin increased, oxycodone 5 mg PO BID given as needed for pain management. For exudative pharyngitis, evaluated by ENT, Unasyn given. symptoms improved.    Medically cleared for discharge by Dr. Greenfield         Post discharge addendum: Sepsis on admission secondary to COVID19

## 2020-03-20 NOTE — PROGRESS NOTE ADULT - PROBLEM SELECTOR PLAN 3
LBP somewhat improved but still c/o pain   patient with h/o fibromyalgia. increased gabapentin.  on oxy 5mg q4 pRN unclear causing nausea?   Started on amitriptyline.   Recent fall on 3/4 s/p xray as outpt neg.   No evidence of significant bone injury.   Monitor pain control.

## 2020-03-20 NOTE — PHYSICAL THERAPY INITIAL EVALUATION ADULT - ADDITIONAL COMMENTS
PTA pt lived in pvt home with  (currently at home), 4 steps to enter +HR, flight to bedroom +HR, has walk in shower, uses SC to ambulate is fully independent.

## 2020-03-20 NOTE — DISCHARGE NOTE NURSING/CASE MANAGEMENT/SOCIAL WORK - PATIENT PORTAL LINK FT
You can access the FollowMyHealth Patient Portal offered by [facility] by registering at the following website: [email or web address]. By joining [employer]’s FollowMyHealth portal, you will also be able to view your health information using other applications (apps) compatible with our system.

## 2020-03-20 NOTE — DISCHARGE NOTE PROVIDER - NSDCFUADDINST_GEN_ALL_CORE_FT
Make appointments to follow up with your out patient physicians.  Bring all discharge paperwork including discharge medication list to your follow up appointments. Outpatient physical therapy recommended by PT  Make appointments to follow up with your out patient physicians.  Bring all discharge paperwork including discharge medication list to your follow up appointments.

## 2020-03-20 NOTE — DISCHARGE NOTE PROVIDER - NSDCCPCAREPLAN_GEN_ALL_CORE_FT
PRINCIPAL DISCHARGE DIAGNOSIS  Diagnosis: Viral pneumonia  Assessment and Plan of Treatment: COVID 19 positive  Respiratory status stable  Please refer to instruction for patients with COVID 19 provided by your nurse  Please follow up with your primray care physician in one  week         SECONDARY DISCHARGE DIAGNOSES  Diagnosis: Exudative pharyngitis  Assessment and Plan of Treatment: You were evaluated by ENT  Treated with antibiotic   Please complete the course of antibiotic as directed       Diagnosis: Pain  Assessment and Plan of Treatment: s/p fall at home on 3/4, s/p xrays as outpatient   Please continue current regimen for pain managment       Diagnosis: Class 1 obesity due to excess calories without serious comorbidity with body mass index (BMI) of 32.0 to 32.9 in adult  Assessment and Plan of Treatment: Weight managment with daily exersie and diet control

## 2020-03-20 NOTE — PROGRESS NOTE ADULT - SUBJECTIVE AND OBJECTIVE BOX
[facility] Division of Hospital Medicine  Torie Garcia MD  Pager (M-F, 1D-5T): [phone]  Other Times:  [phone]    Patient is a 55y old  Female who presents with a chief complaint of Fever (20 Mar 2020 09:40)      SUBJECTIVE / OVERNIGHT EVENTS:  Very anxious. c/o multiple joint pains including back.   Odynophagia slightly better but now with vomiting.   Very poor historian difficult to get specific history. unclear if she has nausea.   afebrile. denies any SOB    ADDITIONAL REVIEW OF SYSTEMS:    MEDICATIONS  (STANDING):  amitriptyline 10 milliGRAM(s) Oral daily  ampicillin/sulbactam  IVPB      ampicillin/sulbactam  IVPB 3 Gram(s) IV Intermittent every 6 hours  cholecalciferol 1000 Unit(s) Oral daily  enoxaparin Injectable 40 milliGRAM(s) SubCutaneous daily  FIRST- Mouthwash  BLM 10 milliLiter(s) Swish and Spit every 8 hours  gabapentin 300 milliGRAM(s) Oral two times a day  hydroxychloroquine 200 milliGRAM(s) Oral two times a day  melatonin 1 milliGRAM(s) Oral at bedtime  multivitamin 1 Tablet(s) Oral daily  pantoprazole    Tablet 40 milliGRAM(s) Oral before breakfast    MEDICATIONS  (PRN):  acetaminophen   Tablet .. 975 milliGRAM(s) Oral every 6 hours PRN Mild Pain (1 - 3)  benzocaine 15 mG/menthol 3.6 mG (Sugar-Free) Lozenge 1 Lozenge Oral three times a day PRN Sore Throat  guaiFENesin   Syrup  (Sugar-Free) 200 milliGRAM(s) Oral every 6 hours PRN Cough  oxyCODONE    IR 5 milliGRAM(s) Oral every 4 hours PRN Severe Pain (7 - 10)      CAPILLARY BLOOD GLUCOSE        I&O's Summary    19 Mar 2020 07:01  -  20 Mar 2020 07:00  --------------------------------------------------------  IN: 1680 mL / OUT: 0 mL / NET: 1680 mL        PHYSICAL EXAM:  Vital Signs Last 24 Hrs  T(C): 37.3 (20 Mar 2020 10:44), Max: 37.3 (20 Mar 2020 10:44)  T(F): 99.1 (20 Mar 2020 10:44), Max: 99.1 (20 Mar 2020 10:44)  HR: 83 (20 Mar 2020 10:44) (73 - 85)  BP: 120/79 (20 Mar 2020 10:44) (107/63 - 128/87)  BP(mean): --  RR: 18 (20 Mar 2020 10:44) (18 - 19)  SpO2: 94% (20 Mar 2020 10:44) (93% - 96%)    CONSTITUTIONAL: mild distress, obese  EYES:  conjunctiva and sclera clear  ENMT: dry oral mucosa,  +posterior pharynx exudate.   NECK: tenderness, no palpable masses; no JVD  RESPIRATORY: Normal respiratory effort, bibasilar rhonchi  CARDIOVASCULAR: Regular rate and rhythm, normal S1 and S2, no murmur/rub/gallop; trace LE edema  ABDOMEN: Nontender to palpation, normoactive bowel sounds, no rebound/guarding  MUSCULOSKELETAL:  dec ROM lower back and right arm sec to pain   PSYCH: A+O to person, place, and time; affect appropriate  SKIN: No rashes; no palpable lesions    LABS:                        12.9   7.61  )-----------( 347      ( 20 Mar 2020 05:47 )             42.0     03-20    139  |  103  |  8   ----------------------------<  84  4.1   |  24  |  0.69    Ca    9.6      20 Mar 2020 05:47                  RADIOLOGY & ADDITIONAL TESTS:  Results Reviewed:   Imaging Personally Reviewed:  Electrocardiogram Personally Reviewed:    COORDINATION OF CARE:  Care Discussed with Consultants/Other Providers [Y/N]:  Prior or Outpatient Records Reviewed [Y/N]:

## 2020-03-20 NOTE — DISCHARGE NOTE PROVIDER - NSDCMRMEDTOKEN_GEN_ALL_CORE_FT
albuterol 90 mcg/inh inhalation aerosol with adapter: 2 puff(s) inhaled every 4 hours, As Needed for wheeze  B Complex 50 oral tablet, extended release: 1 tab(s) orally once a day  gabapentin 300 mg oral tablet: orally once a day (at bedtime)  losartan 50 mg oral tablet: 1 tab(s) orally once a day  Multiple Vitamins oral tablet: 1 tab(s) orally once a day  oxybutynin 5 mg oral tablet: orally once a day  Vitamin D2 50,000 intl units (1.25 mg) oral capsule: 1 cap(s) orally once a week albuterol 90 mcg/inh inhalation aerosol with adapter: 2 puff(s) inhaled every 4 hours, As Needed for wheeze  amitriptyline 10 mg oral tablet: 1 tab(s) orally once a day  amoxicillin-clavulanate 875 mg-125 mg oral tablet: 1 tab(s) orally 2 times a day( last day 3/23)  B Complex 50 oral tablet, extended release: 1 tab(s) orally once a day  gabapentin 300 mg oral capsule: 1 cap(s) orally 2 times a day  hydroxychloroquine 200 mg oral tablet: 1 tab(s) orally 2 times a day ( last day 3/23)  Multiple Vitamins oral tablet: 1 tab(s) orally once a day  oxybutynin 5 mg oral tablet: orally once a day  Rolling walker : ICD 10 R 06.03, R53.1  Vitamin D2 50,000 intl units (1.25 mg) oral capsule: 1 cap(s) orally once a week albuterol 90 mcg/inh inhalation aerosol with adapter: 2 puff(s) inhaled every 4 hours, As Needed for wheeze  amitriptyline 10 mg oral tablet: 1 tab(s) orally once a day  amoxicillin-clavulanate 875 mg-125 mg oral tablet: 1 tab(s) orally 2 times a day( last day 3/23)  B Complex 50 oral tablet, extended release: 1 tab(s) orally once a day  gabapentin 300 mg oral capsule: 1 cap(s) orally 2 times a day  hydroxychloroquine 200 mg oral tablet: 1 tab(s) orally 2 times a day ( last day 3/23)  Multiple Vitamins oral tablet: 1 tab(s) orally once a day  oxybutynin 5 mg oral tablet: orally once a day  Physcial Therapy:   Rolling walker : ICD 10 R 06.03, R53.1  Vitamin D2 50,000 intl units (1.25 mg) oral capsule: 1 cap(s) orally once a week

## 2020-03-20 NOTE — PROGRESS NOTE ADULT - SUBJECTIVE AND OBJECTIVE BOX
INFECTIOUS DISEASES FOLLOW UP-- Ivory Forrest  [phone]    This is a follow up note for this  55yFemale with  Acute pharyngitis,  COVID-19+    interval improvement over the past few days  not using supplemental oxygen      ROS:  CONSTITUTIONAL:  No fever, good appetite  CARDIOVASCULAR:  No chest pain or palpitations  RESPIRATORY:  No dyspnea  GASTROINTESTINAL:  No nausea, vomiting, diarrhea, or abdominal pain  GENITOURINARY:  No dysuria  NEUROLOGIC:  No headache,     Allergies    No Known Allergies    Intolerances        ANTIBIOTICS/RELEVANT:  antimicrobials  ampicillin/sulbactam  IVPB      ampicillin/sulbactam  IVPB 3 Gram(s) IV Intermittent every 6 hours  hydroxychloroquine 200 milliGRAM(s) Oral two times a day    immunologic:    OTHER:  acetaminophen   Tablet .. 975 milliGRAM(s) Oral every 6 hours PRN  amitriptyline 10 milliGRAM(s) Oral daily  benzocaine 15 mG/menthol 3.6 mG (Sugar-Free) Lozenge 1 Lozenge Oral three times a day PRN  cholecalciferol 1000 Unit(s) Oral daily  enoxaparin Injectable 40 milliGRAM(s) SubCutaneous daily  FIRST- Mouthwash  BLM 10 milliLiter(s) Swish and Spit every 8 hours  gabapentin 300 milliGRAM(s) Oral two times a day  guaiFENesin   Syrup  (Sugar-Free) 200 milliGRAM(s) Oral every 6 hours PRN  melatonin 1 milliGRAM(s) Oral at bedtime  multivitamin 1 Tablet(s) Oral daily  oxyCODONE    IR 5 milliGRAM(s) Oral every 4 hours PRN  pantoprazole    Tablet 40 milliGRAM(s) Oral before breakfast      Objective:  Vital Signs Last 24 Hrs  T(C): 37.3 (20 Mar 2020 10:44), Max: 37.3 (20 Mar 2020 10:44)  T(F): 99.1 (20 Mar 2020 10:44), Max: 99.1 (20 Mar 2020 10:44)  HR: 83 (20 Mar 2020 10:44) (73 - 85)  BP: 120/79 (20 Mar 2020 10:44) (107/63 - 128/87)  BP(mean): --  RR: 18 (20 Mar 2020 10:44) (18 - 19)  SpO2: 94% (20 Mar 2020 10:44) (93% - 96%)    PHYSICAL EXAM:  Constitutional:no acute distress  Eyes:MILTON, EOMI  Ear/Nose/Throat: no oral lesions, cough with clear phlegm	  Respiratory: clear BL  Cardiovascular: S1S2  Gastrointestinal:soft, (+) BS, no tenderness  Extremities:no e/e/c  No Lymphadenopathy  IV sites not inflammed.    LABS:                        12.9   7.61  )-----------( 347      ( 20 Mar 2020 05:47 )             42.0     03-20    139  |  103  |  8   ----------------------------<  84  4.1   |  24  |  0.69    Ca    9.6      20 Mar 2020 05:47            MICROBIOLOGY:            RECENT CULTURES:  03-14 @ 09:14  .Throat  --  --  --    No Streptococcus pyogenes (Group A) isolated  --  03-14 @ 06:54  .Blood Blood-Peripheral  --  --  --    No growth at 5 days.  --      RADIOLOGY & ADDITIONAL STUDIES:    < from: CT Chest No Cont (03.14.20 @ 07:21) >    IMPRESSION:     Nonspecific ground glass opacities predominantly in a peripheral distribution may be seen in the setting of viral pneumonia (e.g. COVID-19).    < end of copied text >

## 2020-03-20 NOTE — PHYSICAL THERAPY INITIAL EVALUATION ADULT - PERTINENT HX OF CURRENT PROBLEM, REHAB EVAL
Pt is a 55 y.o female hx fibromyalgia, HTN, seasonal allergies who is presenting with 10 days of fever and viral symptoms, found to have bilateral GGO concerning for viral pneumonia, also recent fall on 3/4 s/p xray as outpt neg. CT chest with Nonspecific ground glass opacities. +COVID 19.

## 2020-03-20 NOTE — PHYSICAL THERAPY INITIAL EVALUATION ADULT - PLANNED THERAPY INTERVENTIONS, PT EVAL
Stair Training: Goal: Improve to 10 steps I with single rail, step to pattern by 2 weeks./gait training/transfer training/bed mobility training

## 2020-03-21 PROCEDURE — 99233 SBSQ HOSP IP/OBS HIGH 50: CPT

## 2020-03-21 RX ADMIN — GABAPENTIN 300 MILLIGRAM(S): 400 CAPSULE ORAL at 06:03

## 2020-03-21 RX ADMIN — AMPICILLIN SODIUM AND SULBACTAM SODIUM 200 GRAM(S): 250; 125 INJECTION, POWDER, FOR SUSPENSION INTRAMUSCULAR; INTRAVENOUS at 06:04

## 2020-03-21 RX ADMIN — DIPHENHYDRAMINE HYDROCHLORIDE AND LIDOCAINE HYDROCHLORIDE AND ALUMINUM HYDROXIDE AND MAGNESIUM HYDRO 10 MILLILITER(S): KIT at 06:03

## 2020-03-21 RX ADMIN — AMPICILLIN SODIUM AND SULBACTAM SODIUM 200 GRAM(S): 250; 125 INJECTION, POWDER, FOR SUSPENSION INTRAMUSCULAR; INTRAVENOUS at 00:55

## 2020-03-21 RX ADMIN — AMPICILLIN SODIUM AND SULBACTAM SODIUM 200 GRAM(S): 250; 125 INJECTION, POWDER, FOR SUSPENSION INTRAMUSCULAR; INTRAVENOUS at 12:19

## 2020-03-21 RX ADMIN — PANTOPRAZOLE SODIUM 40 MILLIGRAM(S): 20 TABLET, DELAYED RELEASE ORAL at 06:03

## 2020-03-21 RX ADMIN — Medication 200 MILLIGRAM(S): at 06:03

## 2020-03-21 RX ADMIN — DIPHENHYDRAMINE HYDROCHLORIDE AND LIDOCAINE HYDROCHLORIDE AND ALUMINUM HYDROXIDE AND MAGNESIUM HYDRO 10 MILLILITER(S): KIT at 15:07

## 2020-03-21 NOTE — PROGRESS NOTE ADULT - SUBJECTIVE AND OBJECTIVE BOX
Patient is a 55y old  Female who presents with a chief complaint of Fever (20 Mar 2020 20:01)      SUBJECTIVE / OVERNIGHT EVENTS:  Still with odynophagia but improved.  No shortness of breath  No fever    MEDICATIONS  (STANDING):  amitriptyline 10 milliGRAM(s) Oral daily  ampicillin/sulbactam  IVPB      ampicillin/sulbactam  IVPB 3 Gram(s) IV Intermittent every 6 hours  cholecalciferol 1000 Unit(s) Oral daily  enoxaparin Injectable 40 milliGRAM(s) SubCutaneous daily  FIRST- Mouthwash  BLM 10 milliLiter(s) Swish and Spit every 8 hours  gabapentin 300 milliGRAM(s) Oral two times a day  hydroxychloroquine 200 milliGRAM(s) Oral two times a day  melatonin 1 milliGRAM(s) Oral at bedtime  multivitamin 1 Tablet(s) Oral daily  pantoprazole    Tablet 40 milliGRAM(s) Oral before breakfast    MEDICATIONS  (PRN):  acetaminophen   Tablet .. 975 milliGRAM(s) Oral every 6 hours PRN Mild Pain (1 - 3)  benzocaine 15 mG/menthol 3.6 mG (Sugar-Free) Lozenge 1 Lozenge Oral three times a day PRN Sore Throat  guaiFENesin   Syrup  (Sugar-Free) 200 milliGRAM(s) Oral every 6 hours PRN Cough  oxyCODONE    IR 5 milliGRAM(s) Oral every 4 hours PRN Severe Pain (7 - 10)      CAPILLARY BLOOD GLUCOSE        I&O's Summary      PHYSICAL EXAM:  Vital Signs Last 24 Hrs  T(C): 36.8 (21 Mar 2020 11:10), Max: 37.4 (20 Mar 2020 20:43)  T(F): 98.3 (21 Mar 2020 11:10), Max: 99.4 (20 Mar 2020 20:43)  HR: 76 (21 Mar 2020 11:10) (72 - 76)  BP: 127/78 (21 Mar 2020 11:10) (122/72 - 128/84)  BP(mean): --  RR: 18 (21 Mar 2020 11:10) (17 - 18)  SpO2: 92% (21 Mar 2020 11:10) (92% - 95%)  GENERAL: NAD, well-developed  HEAD:  Atraumatic, Normocephalic  EYES: EOMI, PERRLA, conjunctiva and sclera clear  NECK: Supple, No JVD, no exudates  CHEST/LUNG: Decreased breath sounds at bases  HEART: Regular rate and rhythm; No murmurs, rubs, or gallops  ABDOMEN: Soft, Nontender, Nondistended; Bowel sounds present  EXTREMITIES:  2+ Peripheral Pulses, No clubbing, cyanosis, or edema  PSYCH: AAOx3  NEUROLOGY: non-focal  SKIN: No rashes or lesions    LABS:                        12.9   7.61  )-----------( 347      ( 20 Mar 2020 05:47 )             42.0     03-20    139  |  103  |  8   ----------------------------<  84  4.1   |  24  |  0.69    Ca    9.6      20 Mar 2020 05:47                RADIOLOGY & ADDITIONAL TESTS:    Imaging Personally Reviewed:    Consultant(s) Notes Reviewed:      Care Discussed with Consultants/Other Providers:

## 2020-03-21 NOTE — PROGRESS NOTE ADULT - PROBLEM SELECTOR PLAN 2
- Cont with IV Unasyn for now.   - Seen by ENT appreciated. exudative   - CT neck if no improvement.

## 2020-03-22 PROCEDURE — 99232 SBSQ HOSP IP/OBS MODERATE 35: CPT

## 2020-03-22 RX ORDER — AMITRIPTYLINE HCL 25 MG
1 TABLET ORAL
Qty: 30 | Refills: 0
Start: 2020-03-22 | End: 2020-04-20

## 2020-03-22 RX ORDER — HYDROXYCHLOROQUINE SULFATE 200 MG
1 TABLET ORAL
Qty: 3 | Refills: 0
Start: 2020-03-22 | End: 2020-03-23

## 2020-03-22 RX ORDER — GABAPENTIN 400 MG/1
0 CAPSULE ORAL
Qty: 0 | Refills: 0 | DISCHARGE

## 2020-03-22 RX ORDER — LOSARTAN POTASSIUM 100 MG/1
1 TABLET, FILM COATED ORAL
Qty: 0 | Refills: 0 | DISCHARGE

## 2020-03-22 RX ORDER — GABAPENTIN 400 MG/1
1 CAPSULE ORAL
Qty: 60 | Refills: 0
Start: 2020-03-22 | End: 2020-04-20

## 2020-03-22 RX ADMIN — DIPHENHYDRAMINE HYDROCHLORIDE AND LIDOCAINE HYDROCHLORIDE AND ALUMINUM HYDROXIDE AND MAGNESIUM HYDRO 10 MILLILITER(S): KIT at 00:30

## 2020-03-22 RX ADMIN — Medication 1 TABLET(S): at 12:38

## 2020-03-22 RX ADMIN — GABAPENTIN 300 MILLIGRAM(S): 400 CAPSULE ORAL at 06:22

## 2020-03-22 RX ADMIN — DIPHENHYDRAMINE HYDROCHLORIDE AND LIDOCAINE HYDROCHLORIDE AND ALUMINUM HYDROXIDE AND MAGNESIUM HYDRO 10 MILLILITER(S): KIT at 06:21

## 2020-03-22 RX ADMIN — Medication 1 MILLIGRAM(S): at 22:10

## 2020-03-22 RX ADMIN — ENOXAPARIN SODIUM 40 MILLIGRAM(S): 100 INJECTION SUBCUTANEOUS at 12:39

## 2020-03-22 RX ADMIN — Medication 10 MILLIGRAM(S): at 12:39

## 2020-03-22 RX ADMIN — Medication 200 MILLIGRAM(S): at 06:21

## 2020-03-22 RX ADMIN — Medication 200 MILLIGRAM(S): at 12:40

## 2020-03-22 RX ADMIN — Medication 975 MILLIGRAM(S): at 12:39

## 2020-03-22 RX ADMIN — Medication 1 MILLIGRAM(S): at 00:30

## 2020-03-22 RX ADMIN — DIPHENHYDRAMINE HYDROCHLORIDE AND LIDOCAINE HYDROCHLORIDE AND ALUMINUM HYDROXIDE AND MAGNESIUM HYDRO 10 MILLILITER(S): KIT at 22:11

## 2020-03-22 RX ADMIN — Medication 1000 UNIT(S): at 12:38

## 2020-03-22 RX ADMIN — PANTOPRAZOLE SODIUM 40 MILLIGRAM(S): 20 TABLET, DELAYED RELEASE ORAL at 06:22

## 2020-03-22 RX ADMIN — GABAPENTIN 300 MILLIGRAM(S): 400 CAPSULE ORAL at 17:12

## 2020-03-22 RX ADMIN — Medication 200 MILLIGRAM(S): at 22:11

## 2020-03-22 RX ADMIN — Medication 1 TABLET(S): at 17:12

## 2020-03-22 RX ADMIN — Medication 975 MILLIGRAM(S): at 22:41

## 2020-03-22 RX ADMIN — Medication 975 MILLIGRAM(S): at 22:11

## 2020-03-22 RX ADMIN — AMPICILLIN SODIUM AND SULBACTAM SODIUM 200 GRAM(S): 250; 125 INJECTION, POWDER, FOR SUSPENSION INTRAMUSCULAR; INTRAVENOUS at 00:51

## 2020-03-22 RX ADMIN — DIPHENHYDRAMINE HYDROCHLORIDE AND LIDOCAINE HYDROCHLORIDE AND ALUMINUM HYDROXIDE AND MAGNESIUM HYDRO 10 MILLILITER(S): KIT at 12:39

## 2020-03-22 RX ADMIN — Medication 200 MILLIGRAM(S): at 17:12

## 2020-03-22 NOTE — PROGRESS NOTE ADULT - PROBLEM SELECTOR PLAN 2
- Completed course of Unasyn.  - Transition to Augmentin BID for total of 7-10 total dose.   - Seen by ENT appreciated. exudative   - CT neck if no improvement.

## 2020-03-22 NOTE — PROGRESS NOTE ADULT - PROBLEM SELECTOR PLAN 1
COVID + confirmed. stable resp status  - CT chest with Nonspecific ground glass opacities   - Isolation, Droplet, Contact, Airborne precautions  - off emperic CTX/ Azithro   - Quant gold neg.  - Respiratory status improved.  - For discharge today

## 2020-03-22 NOTE — PROGRESS NOTE ADULT - SUBJECTIVE AND OBJECTIVE BOX
Patient is a 55y old  Female who presents with a chief complaint of Fever (20 Mar 2020 20:01)      SUBJECTIVE / OVERNIGHT EVENTS:  Odynophagia resolved with antibiotics.  Ambulating well without oxygen.  Still complaining of muscle aches    MEDICATIONS  (STANDING):  amitriptyline 10 milliGRAM(s) Oral daily  ampicillin/sulbactam  IVPB      ampicillin/sulbactam  IVPB 3 Gram(s) IV Intermittent every 6 hours  cholecalciferol 1000 Unit(s) Oral daily  enoxaparin Injectable 40 milliGRAM(s) SubCutaneous daily  FIRST- Mouthwash  BLM 10 milliLiter(s) Swish and Spit every 8 hours  gabapentin 300 milliGRAM(s) Oral two times a day  hydroxychloroquine 200 milliGRAM(s) Oral two times a day  melatonin 1 milliGRAM(s) Oral at bedtime  multivitamin 1 Tablet(s) Oral daily  pantoprazole    Tablet 40 milliGRAM(s) Oral before breakfast    MEDICATIONS  (PRN):  acetaminophen   Tablet .. 975 milliGRAM(s) Oral every 6 hours PRN Mild Pain (1 - 3)  benzocaine 15 mG/menthol 3.6 mG (Sugar-Free) Lozenge 1 Lozenge Oral three times a day PRN Sore Throat  guaiFENesin   Syrup  (Sugar-Free) 200 milliGRAM(s) Oral every 6 hours PRN Cough  oxyCODONE    IR 5 milliGRAM(s) Oral every 4 hours PRN Severe Pain (7 - 10)      CAPILLARY BLOOD GLUCOSE        I&O's Summary      PHYSICAL EXAM:  Vital Signs Last 24 Hrs  T(C): 36.8 (21 Mar 2020 11:10), Max: 37.4 (20 Mar 2020 20:43)  T(F): 98.3 (21 Mar 2020 11:10), Max: 99.4 (20 Mar 2020 20:43)  HR: 76 (21 Mar 2020 11:10) (72 - 76)  BP: 127/78 (21 Mar 2020 11:10) (122/72 - 128/84)  BP(mean): --  RR: 18 (21 Mar 2020 11:10) (17 - 18)  SpO2: 92% (21 Mar 2020 11:10) (92% - 95%)  GENERAL: NAD, well-developed  HEAD:  Atraumatic, Normocephalic  EYES: EOMI, PERRLA, conjunctiva and sclera clear  NECK: Supple, No JVD, no exudates  CHEST/LUNG: Decreased breath sounds at bases  HEART: Regular rate and rhythm; No murmurs, rubs, or gallops  ABDOMEN: Soft, Nontender, Nondistended; Bowel sounds present  EXTREMITIES:  2+ Peripheral Pulses, No clubbing, cyanosis, or edema  PSYCH: AAOx3  NEUROLOGY: non-focal  SKIN: No rashes or lesions    LABS:                        12.9   7.61  )-----------( 347      ( 20 Mar 2020 05:47 )             42.0     03-20    139  |  103  |  8   ----------------------------<  84  4.1   |  24  |  0.69    Ca    9.6      20 Mar 2020 05:47                RADIOLOGY & ADDITIONAL TESTS:    Imaging Personally Reviewed:    Consultant(s) Notes Reviewed:      Care Discussed with Consultants/Other Providers:

## 2020-03-23 VITALS
DIASTOLIC BLOOD PRESSURE: 78 MMHG | OXYGEN SATURATION: 94 % | RESPIRATION RATE: 18 BRPM | SYSTOLIC BLOOD PRESSURE: 126 MMHG | TEMPERATURE: 99 F | HEART RATE: 99 BPM

## 2020-03-23 PROCEDURE — 85014 HEMATOCRIT: CPT

## 2020-03-23 PROCEDURE — 87633 RESP VIRUS 12-25 TARGETS: CPT

## 2020-03-23 PROCEDURE — 85730 THROMBOPLASTIN TIME PARTIAL: CPT

## 2020-03-23 PROCEDURE — 81003 URINALYSIS AUTO W/O SCOPE: CPT

## 2020-03-23 PROCEDURE — 71045 X-RAY EXAM CHEST 1 VIEW: CPT

## 2020-03-23 PROCEDURE — 80048 BASIC METABOLIC PNL TOTAL CA: CPT

## 2020-03-23 PROCEDURE — 86480 TB TEST CELL IMMUN MEASURE: CPT

## 2020-03-23 PROCEDURE — 87581 M.PNEUMON DNA AMP PROBE: CPT

## 2020-03-23 PROCEDURE — 87486 CHLMYD PNEUM DNA AMP PROBE: CPT

## 2020-03-23 PROCEDURE — 87081 CULTURE SCREEN ONLY: CPT

## 2020-03-23 PROCEDURE — 85610 PROTHROMBIN TIME: CPT

## 2020-03-23 PROCEDURE — 84132 ASSAY OF SERUM POTASSIUM: CPT

## 2020-03-23 PROCEDURE — 82955 ASSAY OF G6PD ENZYME: CPT

## 2020-03-23 PROCEDURE — 87040 BLOOD CULTURE FOR BACTERIA: CPT

## 2020-03-23 PROCEDURE — 82435 ASSAY OF BLOOD CHLORIDE: CPT

## 2020-03-23 PROCEDURE — 87798 DETECT AGENT NOS DNA AMP: CPT

## 2020-03-23 PROCEDURE — 87880 STREP A ASSAY W/OPTIC: CPT

## 2020-03-23 PROCEDURE — 87449 NOS EACH ORGANISM AG IA: CPT

## 2020-03-23 PROCEDURE — 86803 HEPATITIS C AB TEST: CPT

## 2020-03-23 PROCEDURE — 99285 EMERGENCY DEPT VISIT HI MDM: CPT | Mod: 25

## 2020-03-23 PROCEDURE — 84295 ASSAY OF SERUM SODIUM: CPT

## 2020-03-23 PROCEDURE — 96374 THER/PROPH/DIAG INJ IV PUSH: CPT

## 2020-03-23 PROCEDURE — 85027 COMPLETE CBC AUTOMATED: CPT

## 2020-03-23 PROCEDURE — U0001: CPT

## 2020-03-23 PROCEDURE — 99239 HOSP IP/OBS DSCHRG MGMT >30: CPT

## 2020-03-23 PROCEDURE — 83605 ASSAY OF LACTIC ACID: CPT

## 2020-03-23 PROCEDURE — 71250 CT THORAX DX C-: CPT

## 2020-03-23 PROCEDURE — 97162 PT EVAL MOD COMPLEX 30 MIN: CPT

## 2020-03-23 PROCEDURE — 82803 BLOOD GASES ANY COMBINATION: CPT

## 2020-03-23 PROCEDURE — 82330 ASSAY OF CALCIUM: CPT

## 2020-03-23 PROCEDURE — 80053 COMPREHEN METABOLIC PANEL: CPT

## 2020-03-23 PROCEDURE — 86703 HIV-1/HIV-2 1 RESULT ANTBDY: CPT

## 2020-03-23 PROCEDURE — 82947 ASSAY GLUCOSE BLOOD QUANT: CPT

## 2020-03-23 RX ORDER — HYDROXYCHLOROQUINE SULFATE 200 MG
1 TABLET ORAL
Qty: 1 | Refills: 0
Start: 2020-03-23 | End: 2020-03-23

## 2020-03-23 RX ORDER — AMITRIPTYLINE HCL 25 MG
1 TABLET ORAL
Qty: 30 | Refills: 0
Start: 2020-03-23 | End: 2020-04-21

## 2020-03-23 RX ORDER — GABAPENTIN 400 MG/1
1 CAPSULE ORAL
Qty: 60 | Refills: 0
Start: 2020-03-23 | End: 2020-04-21

## 2020-03-23 RX ADMIN — Medication 1 TABLET(S): at 12:36

## 2020-03-23 RX ADMIN — GABAPENTIN 300 MILLIGRAM(S): 400 CAPSULE ORAL at 05:16

## 2020-03-23 RX ADMIN — Medication 1 TABLET(S): at 05:16

## 2020-03-23 RX ADMIN — Medication 200 MILLIGRAM(S): at 05:16

## 2020-03-23 RX ADMIN — DIPHENHYDRAMINE HYDROCHLORIDE AND LIDOCAINE HYDROCHLORIDE AND ALUMINUM HYDROXIDE AND MAGNESIUM HYDRO 10 MILLILITER(S): KIT at 05:16

## 2020-03-23 RX ADMIN — DIPHENHYDRAMINE HYDROCHLORIDE AND LIDOCAINE HYDROCHLORIDE AND ALUMINUM HYDROXIDE AND MAGNESIUM HYDRO 10 MILLILITER(S): KIT at 12:37

## 2020-03-23 RX ADMIN — Medication 975 MILLIGRAM(S): at 05:16

## 2020-03-23 RX ADMIN — Medication 1000 UNIT(S): at 12:36

## 2020-03-23 RX ADMIN — ENOXAPARIN SODIUM 40 MILLIGRAM(S): 100 INJECTION SUBCUTANEOUS at 12:36

## 2020-03-23 RX ADMIN — PANTOPRAZOLE SODIUM 40 MILLIGRAM(S): 20 TABLET, DELAYED RELEASE ORAL at 05:16

## 2020-03-23 RX ADMIN — Medication 10 MILLIGRAM(S): at 12:36

## 2020-03-23 NOTE — PROGRESS NOTE ADULT - NSHPATTENDINGPLANDISCUSS_GEN_ALL_CORE
Kendall and YUAN Castro
Kendall and YUAN Salinas
Kendall and YUAN Beasley
Kendall and YUAN Beasley
Kendall and YUAN Castro
Kendall and YUAN Salinas

## 2020-03-23 NOTE — PROGRESS NOTE ADULT - SUBJECTIVE AND OBJECTIVE BOX
[facility] Division of Hospital Medicine  Torie Garcia MD  Pager (M-F, 8A-5P): [phone]  Other Times:  [phone]    Patient is a 55y old  Female who presents with a chief complaint of Fever (22 Mar 2020 13:15)      SUBJECTIVE / OVERNIGHT EVENTS:  Feeling better. tolerating PO diet. afebrile.   no acute issues overnight. denies SOB    ADDITIONAL REVIEW OF SYSTEMS:    MEDICATIONS  (STANDING):  amitriptyline 10 milliGRAM(s) Oral daily  amoxicillin  875 milliGRAM(s)/clavulanate 1 Tablet(s) Oral two times a day  cholecalciferol 1000 Unit(s) Oral daily  enoxaparin Injectable 40 milliGRAM(s) SubCutaneous daily  FIRST- Mouthwash  BLM 10 milliLiter(s) Swish and Spit every 8 hours  gabapentin 300 milliGRAM(s) Oral two times a day  hydroxychloroquine 200 milliGRAM(s) Oral two times a day  melatonin 1 milliGRAM(s) Oral at bedtime  multivitamin 1 Tablet(s) Oral daily  pantoprazole    Tablet 40 milliGRAM(s) Oral before breakfast    MEDICATIONS  (PRN):  acetaminophen   Tablet .. 975 milliGRAM(s) Oral every 6 hours PRN Mild Pain (1 - 3)  benzocaine 15 mG/menthol 3.6 mG (Sugar-Free) Lozenge 1 Lozenge Oral three times a day PRN Sore Throat  guaiFENesin   Syrup  (Sugar-Free) 200 milliGRAM(s) Oral every 6 hours PRN Cough  oxyCODONE    IR 5 milliGRAM(s) Oral every 4 hours PRN Severe Pain (7 - 10)      CAPILLARY BLOOD GLUCOSE        I&O's Summary    22 Mar 2020 07:01  -  23 Mar 2020 07:00  --------------------------------------------------------  IN: 780 mL / OUT: 0 mL / NET: 780 mL        PHYSICAL EXAM:  Vital Signs Last 24 Hrs  T(C): 37.1 (23 Mar 2020 14:02), Max: 37.1 (23 Mar 2020 14:02)  T(F): 98.7 (23 Mar 2020 14:02), Max: 98.7 (23 Mar 2020 14:02)  HR: 99 (23 Mar 2020 14:02) (74 - 99)  BP: 126/78 (23 Mar 2020 14:02) (121/75 - 154/90)  BP(mean): --  RR: 18 (23 Mar 2020 14:02) (18 - 18)  SpO2: 94% (23 Mar 2020 14:02) (94% - 98%)    CONSTITUTIONAL: NAD, obese  EYES:  conjunctiva and sclera clear  ENMT: dry oral mucosa,  +posterior pharynx exudate.   NECK: tenderness, no palpable masses; no JVD  RESPIRATORY: Normal respiratory effort, bibasilar rhonchi  CARDIOVASCULAR: Regular rate and rhythm, normal S1 and S2, no murmur/rub/gallop; trace LE edema  ABDOMEN: Nontender to palpation, normoactive bowel sounds, no rebound/guarding  MUSCULOSKELETAL:  dec ROM lower back and right arm sec to pain   PSYCH: A+O to person, place, and time; affect appropriate  SKIN: No rashes; no palpable lesions    LABS:                      RADIOLOGY & ADDITIONAL TESTS:  Results Reviewed:   Imaging Personally Reviewed:  Electrocardiogram Personally Reviewed:    COORDINATION OF CARE:  Care Discussed with Consultants/Other Providers [Y/N]:  Prior or Outpatient Records Reviewed [Y/N]:

## 2020-03-23 NOTE — PROGRESS NOTE ADULT - ATTENDING COMMENTS
d/w Son Somet at length.
d/w Son Somet at length.  d/c planning likely home in 24-48 hrs to home with quarantine
d/w Son Somet at length again. Patient is stable from COVID19 infection standpoint and no longer require inpt hospitalization.   However patient seems very anxious and tearful. Poor historian.   Will cont to monitor on Abx for pharyngitis and observe for PO intake.   d/c planning once above symptoms improve and tolerating PO.  Will need home quarantine for 2 weeks since diagnosis
d/w Son Somet at length again. Patient is stable from COVID19 infection standpoint and no longer require inpt hospitalization.   d/c planning home today   d/c time 40 mins
d/w Son Somet at length.  d/c planning
d/w Son Somet at length.  d/c planning
left message for son Somet pending call back

## 2020-06-04 ENCOUNTER — TRANSCRIPTION ENCOUNTER (OUTPATIENT)
Age: 55
End: 2020-06-04

## 2021-01-06 NOTE — PATIENT PROFILE ADULT - NSPROMEDSADMININFO_GEN_A_NUR
Admission Reconciliation is Completed  Discharge Reconciliation is Not Complete Admission Reconciliation is Completed  Discharge Reconciliation is Completed no concerns

## 2023-07-06 NOTE — PROVIDER CONTACT NOTE (CRITICAL VALUE NOTIFICATION) - PERSON GIVING RESULT:
Lab Zeus Portillo Cyclosporine Counseling:  I discussed with the patient the risks of cyclosporine including but not limited to hypertension, gingival hyperplasia,myelosuppression, immunosuppression, liver damage, kidney damage, neurotoxicity, lymphoma, and serious infections. The patient understands that monitoring is required including baseline blood pressure, CBC, CMP, lipid panel and uric acid, and then 1-2 times monthly CMP and blood pressure.

## 2024-05-31 NOTE — PROGRESS NOTE ADULT - PROBLEM/PLAN-6
5/30/24  Levothyroxine ordered at [facility] for 3 months.     [facility]    Rain Thomas RN on 5/31/2024 at 1:27 PM     DISPLAY PLAN FREE TEXT